# Patient Record
Sex: MALE | Race: WHITE | NOT HISPANIC OR LATINO | Employment: UNEMPLOYED | URBAN - NONMETROPOLITAN AREA
[De-identification: names, ages, dates, MRNs, and addresses within clinical notes are randomized per-mention and may not be internally consistent; named-entity substitution may affect disease eponyms.]

---

## 2018-04-26 ENCOUNTER — OFFICE VISIT (OUTPATIENT)
Dept: INTERNAL MEDICINE | Facility: CLINIC | Age: 53
End: 2018-04-26

## 2018-04-26 VITALS
SYSTOLIC BLOOD PRESSURE: 156 MMHG | WEIGHT: 207 LBS | OXYGEN SATURATION: 99 % | DIASTOLIC BLOOD PRESSURE: 102 MMHG | HEART RATE: 70 BPM | TEMPERATURE: 98.6 F | HEIGHT: 71 IN | BODY MASS INDEX: 28.98 KG/M2

## 2018-04-26 DIAGNOSIS — K42.9 UMBILICAL HERNIA WITHOUT OBSTRUCTION AND WITHOUT GANGRENE: Primary | ICD-10-CM

## 2018-04-26 DIAGNOSIS — R03.0 BLOOD PRESSURE ELEVATED WITHOUT HISTORY OF HTN: ICD-10-CM

## 2018-04-26 PROBLEM — C20 CA OF RECTUM (HCC): Status: ACTIVE | Noted: 2018-04-26

## 2018-04-26 PROCEDURE — 99214 OFFICE O/P EST MOD 30 MIN: CPT | Performed by: PHYSICIAN ASSISTANT

## 2018-04-26 NOTE — PROGRESS NOTES
Jorgito Luis is a 52 y.o. male.     Subjective   History of Present Illness   This past weekend he was raking and then the following day noticed a red and tender bulge in the umbilicus.  In 2014 had laparoscopic surgery through the umbilicus.     BP elevated today. He checked at home 2-3 weeks ago which was 146/97 which reduced to 132/80 after sitting for 10 minutes. Patient denies chest pain, dyspnea, orthopnea, palpitations, lower extremity edema, headaches, weakness, visual disturbances or confusion.         The following portions of the patient's history were reviewed and updated as appropriate: allergies, current medications, past family history, past medical history, past social history, past surgical history and problem list.    Review of Systems    Constitutional: Negative for appetite change, chills, fatigue, fever and unexpected weight change.   HENT: Negative for congestion, ear pain, hearing loss, nosebleeds, postnasal drip, rhinorrhea, sore throat, tinnitus and trouble swallowing.    Eyes: Negative for photophobia, discharge and visual disturbance.   Respiratory: Negative for cough, chest tightness, shortness of breath and wheezing.    Cardiovascular: Negative for chest pain, palpitations and leg swelling.   Gastrointestinal: bulge and redness in umbilicus. Negative for abdominal pain, blood in stool, constipation, diarrhea, nausea and vomiting.   Endocrine: Negative for cold intolerance, heat intolerance, polydipsia, polyphagia and polyuria.   Musculoskeletal: Negative for arthralgias, back pain, joint swelling, myalgias, neck pain and neck stiffness.   Skin: Negative for color change, pallor, rash and wound.   Allergic/Immunologic: Negative for environmental allergies, food allergies and immunocompromised state.   Neurological: Negative for dizziness, tremors, seizures, weakness, numbness and headaches.   Hematological: Negative for adenopathy. Does not bruise/bleed easily.   Psychiatric/Behavioral:  "Negative for sleep disturbances, agitation, behavioral problems, confusion, hallucinations, self-injury and suicidal ideas. The patient is not nervous/anxious.      Objective    Physical Exam  Constitutional: Oriented to person, place, and time. Appears well-developed and well-nourished.    Head: Normocephalic and atraumatic.   Eyes: EOM are normal. Pupils are equal, round, and reactive to light.   Neck: Normal range of motion. Neck supple.   Cardiovascular: Normal rate, regular rhythm and normal heart sounds.    Pulmonary/Chest: Effort normal and breath sounds normal. No respiratory distress.  Has no wheezes or rales. Exhibits no chest wall tenderness.   Abdominal: small non-incarcerated umbilical hernia with minimal tenderness. Soft. Bowel sounds are normal.  Musculoskeletal: Normal range of motion. Exhibits no tenderness.   Neurological: Alert and oriented to person, place, and time.   Skin: Skin is warm and dry.   Psychiatric: Has a normal mood and affect. Behavior is normal. Judgment and thought content normal.       BP (!) 156/102   Pulse 70   Temp 98.6 °F (37 °C)   Ht 180.3 cm (71\")   Wt 93.9 kg (207 lb)   SpO2 99%   BMI 28.87 kg/m²     Nursing note and vitals reviewed.        Assessment/Plan   Jorgito was seen today for abdominal issue with past surgery site.    Diagnoses and all orders for this visit:    Umbilical hernia without obstruction and without gangrene  Reassurance provided. He declined referral to general surgery at this time.     Blood pressure elevated without history of HTN  BP recheck 150/110.   Discussed implications of uncontrolled hypertension and he was resistant to treatment. He declined labs today.   Strongly encouraged him to follow up in 2 weeks for BP recheck as well as initiation of a low-sodium diet and exercise regimen.       He declined referral for colonoscopy today despite my recommendation. He has a history of rectal cancer.      "

## 2021-12-22 ENCOUNTER — TRANSCRIBE ORDERS (OUTPATIENT)
Dept: ADMINISTRATIVE | Facility: HOSPITAL | Age: 56
End: 2021-12-22

## 2021-12-22 DIAGNOSIS — M25.562 LEFT KNEE PAIN, UNSPECIFIED CHRONICITY: Primary | ICD-10-CM

## 2022-01-19 ENCOUNTER — HOSPITAL ENCOUNTER (OUTPATIENT)
Dept: MRI IMAGING | Facility: HOSPITAL | Age: 57
End: 2022-01-19

## 2022-01-21 ENCOUNTER — HOSPITAL ENCOUNTER (OUTPATIENT)
Dept: MRI IMAGING | Facility: HOSPITAL | Age: 57
Discharge: HOME | End: 2022-01-21
Admitting: ORTHOPAEDIC SURGERY

## 2022-01-21 DIAGNOSIS — M25.562 LEFT KNEE PAIN, UNSPECIFIED CHRONICITY: ICD-10-CM

## 2022-01-21 PROCEDURE — 73721 MRI JNT OF LWR EXTRE W/O DYE: CPT

## 2022-02-09 ENCOUNTER — OFFICE VISIT (OUTPATIENT)
Dept: NEUROLOGY | Facility: CLINIC | Age: 57
End: 2022-02-09

## 2022-02-09 VITALS
SYSTOLIC BLOOD PRESSURE: 150 MMHG | WEIGHT: 206 LBS | HEART RATE: 83 BPM | BODY MASS INDEX: 28.84 KG/M2 | DIASTOLIC BLOOD PRESSURE: 90 MMHG | HEIGHT: 71 IN | OXYGEN SATURATION: 97 % | TEMPERATURE: 97.1 F

## 2022-02-09 DIAGNOSIS — R06.83 SNORING: ICD-10-CM

## 2022-02-09 DIAGNOSIS — G47.9 RESTLESS SLEEPER: ICD-10-CM

## 2022-02-09 DIAGNOSIS — R41.3 MEMORY LOSS: Primary | ICD-10-CM

## 2022-02-09 PROCEDURE — 99213 OFFICE O/P EST LOW 20 MIN: CPT | Performed by: NURSE PRACTITIONER

## 2022-02-09 RX ORDER — DIAPER,BRIEF,INFANT-TODD,DISP
EACH MISCELLANEOUS
COMMUNITY
Start: 2021-11-19

## 2022-02-09 RX ORDER — LIDOCAINE 5 %
ADHESIVE PATCH, MEDICATED TOPICAL
COMMUNITY
Start: 2022-02-07

## 2022-02-09 RX ORDER — OMEGA-3-ACID ETHYL ESTERS 1 G/1
CAPSULE, LIQUID FILLED ORAL
COMMUNITY
Start: 2021-11-19

## 2022-02-09 RX ORDER — ALUMINUM HYDROXIDE, MAGNESIUM HYDROXIDE, SIMETHICONE 400; 400; 40 MG/10ML; MG/10ML; MG/10ML
SUSPENSION ORAL
COMMUNITY
Start: 2021-11-19

## 2022-02-09 RX ORDER — LISINOPRIL 10 MG/1
10 TABLET ORAL DAILY
COMMUNITY
Start: 2022-01-19

## 2022-02-09 RX ORDER — ALBUTEROL SULFATE 2.5 MG/3ML
SOLUTION RESPIRATORY (INHALATION)
COMMUNITY
Start: 2022-01-19
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
## 2022-02-09 NOTE — PATIENT INSTRUCTIONS
Sleep Apnea  Sleep apnea affects breathing during sleep. It causes breathing to stop for a short time or to become shallow. It can also increase the risk of:  · Heart attack.  · Stroke.  · Being very overweight (obese).  · Diabetes.  · Heart failure.  · Irregular heartbeat.  The goal of treatment is to help you breathe normally again.  What are the causes?  There are three kinds of sleep apnea:  · Obstructive sleep apnea. This is caused by a blocked or collapsed airway.  · Central sleep apnea. This happens when the brain does not send the right signals to the muscles that control breathing.  · Mixed sleep apnea. This is a combination of obstructive and central sleep apnea.  The most common cause of this condition is a collapsed or blocked airway. This can happen if:  · Your throat muscles are too relaxed.  · Your tongue and tonsils are too large.  · You are overweight.  · Your airway is too small.  What increases the risk?  · Being overweight.  · Smoking.  · Having a small airway.  · Being older.  · Being male.  · Drinking alcohol.  · Taking medicines to calm yourself (sedatives or tranquilizers).  · Having family members with the condition.  What are the signs or symptoms?  · Trouble staying asleep.  · Being sleepy or tired during the day.  · Getting angry a lot.  · Loud snoring.  · Headaches in the morning.  · Not being able to focus your mind (concentrate).  · Forgetting things.  · Less interest in sex.  · Mood swings.  · Personality changes.  · Feelings of sadness (depression).  · Waking up a lot during the night to pee (urinate).  · Dry mouth.  · Sore throat.  How is this diagnosed?  · Your medical history.  · A physical exam.  · A test that is done when you are sleeping (sleep study). The test is most often done in a sleep lab but may also be done at home.  How is this treated?    · Sleeping on your side.  · Using a medicine to get rid of mucus in your nose (decongestant).  · Avoiding the use of alcohol,  medicines to help you relax, or certain pain medicines (narcotics).  · Losing weight, if needed.  · Changing your diet.  · Not smoking.  · Using a machine to open your airway while you sleep, such as:  ? An oral appliance. This is a mouthpiece that shifts your lower jaw forward.  ? A CPAP device. This device blows air through a mask when you breathe out (exhale).  ? An EPAP device. This has valves that you put in each nostril.  ? A BPAP device. This device blows air through a mask when you breathe in (inhale) and breathe out.  · Having surgery if other treatments do not work.  It is important to get treatment for sleep apnea. Without treatment, it can lead to:  · High blood pressure.  · Coronary artery disease.  · In men, not being able to have an erection (impotence).  · Reduced thinking ability.  Follow these instructions at home:  Lifestyle  · Make changes that your doctor recommends.  · Eat a healthy diet.  · Lose weight if needed.  · Avoid alcohol, medicines to help you relax, and some pain medicines.  · Do not use any products that contain nicotine or tobacco, such as cigarettes, e-cigarettes, and chewing tobacco. If you need help quitting, ask your doctor.  General instructions  · Take over-the-counter and prescription medicines only as told by your doctor.  · If you were given a machine to use while you sleep, use it only as told by your doctor.  · If you are having surgery, make sure to tell your doctor you have sleep apnea. You may need to bring your device with you.  · Keep all follow-up visits as told by your doctor. This is important.  Contact a doctor if:  · The machine that you were given to use during sleep bothers you or does not seem to be working.  · You do not get better.  · You get worse.  Get help right away if:  · Your chest hurts.  · You have trouble breathing in enough air.  · You have an uncomfortable feeling in your back, arms, or stomach.  · You have trouble talking.  · One side of your  body feels weak.  · A part of your face is hanging down.  These symptoms may be an emergency. Do not wait to see if the symptoms will go away. Get medical help right away. Call your local emergency services (911 in the [place] Do not drive yourself to the hospital.  Summary  · This condition affects breathing during sleep.  · The most common cause is a collapsed or blocked airway.  · The goal of treatment is to help you breathe normally while you sleep.  This information is not intended to replace advice given to you by your health care provider. Make sure you discuss any questions you have with your health care provider.  Document Revised: 10/04/2019 Document Reviewed: 08/13/2019  [employer] Patient Education © 2021 [employer]  Dementia  Dementia is a condition that affects the way the brain works. It often affects memory and thinking. There are many types of dementia. Some types get worse with time and cannot be reversed. Some types of dementia include:  · Alzheimer's disease. This is the most common type.  · Vascular dementia. This type may happen due to a stroke.  · Lewy body dementia. This type may happen to people who have Parkinson's disease.  · Frontotemporal dementia. This type is caused by damage to nerve cells in certain parts of the brain.  Some people may have more than one type.  What are the causes?  This condition is caused by damage to cells in the brain. Some causes that cannot be reversed include:  · Having a condition that affects the blood vessels of the brain, such as diabetes, heart disease, or blood vessel disease.  · Changes to genes.  Some causes that can be reversed or slowed include:  · Injury to the brain.  · Certain medicines.  · Infection.  · Not having enough vitamin B12 in the body, or thyroid problems.  · A tumor, blood clot, or too much fluid in the brain.  · Certain diseases that cause your body's defense system (immune system) to attack healthy parts of the body.  What are the  signs or symptoms?  · Problems remembering events or people.  · Having trouble taking a bath or putting clothes on.  · Forgetting appointments.  · Forgetting to pay bills.  · Trouble planning and making meals.  · Having trouble speaking.  · Getting lost easily.  · Changes in behavior or mood.  How is this treated?  Treatment depends on the cause of the dementia. It might include:  · Taking medicines for symptoms or to help control or slow down the dementia.  · Treating the cause of your dementia.  Your doctor can help you find support groups and other doctors who can help with your care.  Follow these instructions at home:  Medicines  · Take over-the-counter and prescription medicines only as told by your doctor.  · Use a pill organizer to help you manage your medicines.  · Avoidtaking medicines for pain or for sleep.  Lifestyle  · Make healthy choices:  ? Be active as told by your doctor.  ? Do not smoke or use any products that contain nicotine or tobacco. If you need help quitting, ask your doctor.  ? Do not drink alcohol.  ? When you get stressed, do something that will help you relax. Your doctor can give you tips.  ? Spend time with other people.  · Make sure you get good sleep. To get good sleep:  ? Try not to take naps during the day.  ? Keep your bedroom dark and cool.  ? In the few hours before you go to bed, try not to do any exercise.  ? Do not have foods and drinks with caffeine at night.  Eating and drinking  · Drink enough fluid to keep your pee (urine) pale yellow.  · Eat a healthy diet.  General instructions    · Talk with your doctor to figure out:  ? What you need help with.  ? What your safety needs are.  · Ask your doctor if it is safe for you to drive.  · If told, wear a bracelet that tracks where you are or shows that you are a person with memory loss.  · Work with your family to make big decisions.  · Keep all follow-up visits.    Where to find more information  · Alzheimer's Association:  www.alz.org  · National [place] on Aging: www.radha.nih.gov/alzheimers  · World Health Organization: www.who.int  Contact a doctor if:  · You have any new symptoms.  · Your symptoms get worse.  · You have problems with swallowing or choking.  Get help right away if:  · You feel very sad, or feel that you want to harm yourself.  · Your family members are worried for your safety.  Get help right away if you feel like you may hurt yourself or others, or have thoughts about taking your own life. Go to your nearest emergency room or:  · Call your local emergency services (911 in the [place]  · Call the National Suicide Prevention Lifeline at 1-253.656.3679. This is open 24 hours a day.  · Text the Crisis Text Line at 582486.  Summary  · Dementia often affects memory and thinking.  · Some types of dementia get worse with time and cannot be reversed.  · Treatment for this condition depends on the cause.  · Talk with your doctor to figure out what you need help with.  · Your doctor can help you find support groups and other doctors who can help with your care.  This information is not intended to replace advice given to you by your health care provider. Make sure you discuss any questions you have with your health care provider.  Document Revised: 05/03/2021 Document Reviewed: 05/03/2021  [employer]vier Patient Education © 2021 [employer]  Mild Neurocognitive Disorder  Mild neurocognitive disorder, formerly known as mild cognitive impairment, is a disorder in which memory does not work as well as it should. This disorder may also cause problems with other mental functions, including thought, communication, behavior, and completion of tasks. These problems can be noticed and measured, but they usually do not interfere with daily activities or the ability to live independently.  Mild neurocognitive disorder typically develops after 60 years of age, but it can also develop at younger ages. It is not as serious as major  neurocognitive disorder, also known as dementia, but it may be the first sign of it. Generally, symptoms of this condition get worse over time. In rare cases, symptoms can get better.  What are the causes?  This condition may be caused by:  · Brain disorders like Alzheimer's disease, Parkinson's disease, and other conditions that gradually damage nerve cells (neurodegenerative conditions).  · Diseases that affect blood vessels in the brain and result in small strokes.  · Certain infections, such as HIV.  · Traumatic brain injury.  · Other medical conditions, such as brain tumors, underactive thyroid (hypothyroidism), and vitamin B12 deficiency.  · Use of certain drugs or prescription medicines.  What increases the risk?  The following factors may make you more likely to develop this condition:  · Being older than 65 years.  · Being male.  · Low education level.  · Diabetes, high blood pressure, high cholesterol, and other conditions that increase the risk for blood vessel diseases.  · Untreated or undertreated sleep apnea.  · Having a certain type of gene that can be passed from parent to child (inherited).  · Chronic health problems such as heart disease, lung disease, liver disease, kidney disease, or depression.  What are the signs or symptoms?  Symptoms of this condition include:  · Difficulty remembering. You may:  ? Forget names, phone numbers, or details of recent events.  ? Forget social events and appointments.  ? Repeatedly forget where you put your car keys or other items.  · Difficulty thinking and solving problems. You may have trouble with complex tasks, such as:  ? Paying bills.  ? Driving in unfamiliar places.  · Difficulty communicating. You may have trouble:  ? Finding the right word or naming an object.  ? Forming a sentence that makes sense, or understanding what you read or hear.  · Changes in your behavior or personality. When this happens, you may:  ? Lose interest in the things that you used  to enjoy.  ? Withdraw from social situations.  ? Get angry more easily than usual.  ? Act before thinking.  How is this diagnosed?  This condition is diagnosed based on:  · Your symptoms. Your health care provider may ask you and the people you spend time with, such as family and friends, about your symptoms.  · Evaluation of mental functions (neuropsychological testing). Your health care provider may refer you to a neurologist or mental health specialist to evaluate your mental functions in detail.  To identify the cause of your condition, your health care provider may:  · Get a detailed medical history.  · Ask about use of alcohol, drugs, and prescription medicines.  · Do a physical exam.  · Order blood tests and brain imaging exams.  How is this treated?  Mild neurocognitive disorder that is caused by medicine use, drug use, infection, or another medical condition may improve when the cause is treated, or when medicines or drugs are stopped. If this disorder has another cause, it generally does not improve and may get worse. In these cases, the goal of treatment is to help you manage the loss of mental function. Treatments in these cases include:  · Medicine. Medicine mainly helps memory and behavior symptoms.  · Talk therapy. Talk therapy provides education, emotional support, memory aids, and other ways of making up for problems with mental function.  · Lifestyle changes, including:  ? Getting regular exercise.  ? Eating a healthy diet that includes omega-3 fatty acids.  ? Challenging your thinking and memory skills.  ? Having more social interaction.  Follow these instructions at home:  Eating and drinking    · Drink enough fluid to keep your urine pale yellow.  · Eat a healthy diet that includes omega-3 fatty acids. These can be found in:  ? Fish.  ? Nuts.  ? Leafy vegetables.  ? Vegetable oils.  · If you drink alcohol:  ? Limit how much you use to:  § 0-1 drink a day for women.  § 0-2 drinks a day for  men.  ? Be aware of how much alcohol is in your drink. In the [place] one drink equals one 12 oz bottle of beer (355 mL), one 5 oz glass of wine (148 mL), or one 1½ oz glass of hard liquor (44 mL).    Lifestyle    · Get regular exercise as told by your health care provider.  · Do not use any products that contain nicotine or tobacco, such as cigarettes, e-cigarettes, and chewing tobacco. If you need help quitting, ask your health care provider.  · Practice ways to manage stress. If you need help managing stress, ask your health care provider.  · Continue to have social interaction.  · Keep your mind active with stimulating activities you enjoy, such as reading or playing games.  · Make sure to get quality sleep. Follow these tips:  ? Avoid napping during the day.  ? Keep your sleeping area dark and cool.  ? Avoid exercising during the few hours before you go to bed.  ? Avoid caffeine products in the evening.    General instructions  · Take over-the-counter and prescription medicines only as told by your health care provider. Your health care provider may recommend that you avoid taking medicines that can affect thinking, such as pain medicines or sleep medicines.  · Work with your health care provider to find out what you need help with and what your safety needs are.  · Keep all follow-up visits. This is important.  Where to find more information  · National [place] on Aging: www.radha.nih.gov  Contact a health care provider if:  · You have any new symptoms.  Get help right away if:  · You develop new confusion or your confusion gets worse.  · You act in ways that place you or your family in danger.  Summary  · Mild neurocognitive disorder is a disorder in which memory does not work as well as it should.  · Mild neurocognitive disorder can have many causes. It may be the first stage of dementia.  · To manage your condition, get regular exercise, keep your mind active, get quality sleep, and eat a healthy diet.  This  information is not intended to replace advice given to you by your health care provider. Make sure you discuss any questions you have with your health care provider.  Document Revised: 05/03/2021 Document Reviewed: 05/03/2021  Elsevier Patient Education © 2021 [employer].

## 2022-02-18 ENCOUNTER — TRANSCRIBE ORDERS (OUTPATIENT)
Dept: ADMINISTRATIVE | Facility: HOSPITAL | Age: 57
End: 2022-02-18

## 2022-02-18 DIAGNOSIS — R13.10 DYSPHAGIA, UNSPECIFIED TYPE: Primary | ICD-10-CM

## 2022-02-22 NOTE — PROGRESS NOTES
Patient: Jorgito Luis    YOB: 1965    Date: 02/23/2022    Primary Care Provider: La Ramey APRN    Chief Complaint   Patient presents with   • Hernia     umbilical       SUBJECTIVE:    History of present illness:  I saw the patient in the office today as a consultation for evaluation and treatment of an umbilical mass.He states that he had colorectal cancer and he has surgery in 10/2014, knot has formed at umbilicus.  Hernia slightly increased in size, increased pain discomfort with prolonged standing and lifting.  No change in bowel habits.    The following portions of the patient's history were reviewed and updated as appropriate: allergies, current medications, past family history, past medical history, past social history, past surgical history and problem list.    Review of Systems   Constitutional: Negative for chills, fever and unexpected weight change.   HENT: Negative for trouble swallowing and voice change.    Eyes: Negative for visual disturbance.   Respiratory: Negative for apnea, cough, chest tightness, shortness of breath and wheezing.    Cardiovascular: Negative for chest pain, palpitations and leg swelling.   Gastrointestinal: Positive for abdominal pain. Negative for abdominal distention, anal bleeding, blood in stool, constipation, diarrhea, nausea, rectal pain and vomiting.   Endocrine: Negative for cold intolerance and heat intolerance.   Genitourinary: Negative for difficulty urinating, dysuria, flank pain, scrotal swelling and testicular pain.   Musculoskeletal: Negative for back pain, gait problem and joint swelling.   Skin: Negative for color change, rash and wound.   Neurological: Negative for dizziness, syncope, speech difficulty, weakness, numbness and headaches.   Hematological: Negative for adenopathy. Does not bruise/bleed easily.   Psychiatric/Behavioral: Negative for confusion. The patient is not nervous/anxious.        History:  Past Medical History:   Diagnosis  "Date   • Arthritis    • Cancer (HCC) 2014    colon-rectum   • Cancer (HCC)    • Difficulty in walking    • Hearing loss    • Hypertension    • Memory loss        Past Surgical History:   Procedure Laterality Date   • ABDOMINAL SURGERY  2014    colon cancer removed rectum       Family History   Problem Relation Age of Onset   • Hypertension Mother    • Arthritis Mother    • Cancer Mother    • Diabetes Mother    • Heart disease Mother    • Hyperlipidemia Mother    • Malig Hypertension Mother    • Hypertension Father    • Arthritis Father    • Alcohol abuse Father    • Diabetes Father    • Heart disease Father    • Malig Hypertension Father    • Obesity Father        Social History     Tobacco Use   • Smoking status: Never Smoker   • Smokeless tobacco: Never Used   Substance Use Topics   • Alcohol use: Never   • Drug use: Not on file       Allergies:  Allergies   Allergen Reactions   • Statins Myalgia     Hurt his legs when he took them       Medications:    Current Outpatient Medications:   •  albuterol (PROVENTIL) (2.5 MG/3ML) 0.083% nebulizer solution, USE 3 ML VIA NEBULIZER THREE TIMES DAILY AS NEEDED, Disp: , Rfl:   •  Antacid 200-200-20 MG/5ML suspension, , Disp: , Rfl:   •  CBD (cannabidiol) oral oil, Take  by mouth Daily., Disp: , Rfl:   •  Diclofenac Sodium (VOLTAREN) 1 % gel gel, APPLY 2 GRAMS TOPICALLY TO THE AFFECTED AREA FOUR TIMES DAILY, Disp: , Rfl:   •  Hydrocortisone Max St 1 % cream, , Disp: , Rfl:   •  Lidoderm 5 %, , Disp: , Rfl:   •  lisinopril (PRINIVIL,ZESTRIL) 10 MG tablet, Take 10 mg by mouth Daily., Disp: , Rfl:   •  omega-3 acid ethyl esters (LOVAZA) 1 g capsule, TAKE 2 CAPSULES BY MOUTH TWICE DAILY AS DIRECTED, Disp: , Rfl:     OBJECTIVE:    Vital Signs:   Vitals:    02/23/22 1055   BP: 142/80   Pulse: 91   Resp: 18   Temp: 97.7 °F (36.5 °C)   TempSrc: Temporal   SpO2: 97%   Weight: 90.9 kg (200 lb 6.4 oz)   Height: 180.3 cm (71\")       Physical Exam:   General Appearance:    Alert, " cooperative, in no acute distress   Head:    Normocephalic, without obvious abnormality, atraumatic   Eyes:            Lids and lashes normal, conjunctivae and sclerae normal, no   icterus, no pallor, corneas clear, PERRLA   Ears:    Ears appear intact with no abnormalities noted   Throat:   No oral lesions, no thrush, oral mucosa moist   Neck:   No adenopathy, supple, trachea midline, no thyromegaly, no   carotid bruit, no JVD   Lungs:     Clear to auscultation,respirations regular, even and                  unlabored    Heart:    Regular rhythm and normal rate, normal S1 and S2, no            murmur   Abdomen:     no masses, no organomegaly, soft non-tender, non-distended, no guarding, there is evidence of a refill incisional hernia supraumbilical region.  Reducible and tender to palpation.   Extremities:   Moves all extremities well, no edema, no cyanosis, no             redness   Pulses:   Pulses palpable and equal bilaterally   Skin:   No bleeding, bruising or rash   Lymph nodes:   No palpable adenopathy   Neurologic:   Cranial nerves 2 - 12 grossly intact, sensation intact        Results Review:   I reviewed the patient's new clinical results.    Review of Systems was reviewed and confirmed as accurate as documented by the MA.    ASSESSMENT/PLAN:    1. Incisional hernia, without obstruction or gangrene        I had a detailed and extensive discussion with the patient in the office and they understand that they need to undergo incisional hernia repair with mesh.  Full risks and benefits of operative versus nonoperative intervention were discussed with the patient and these included things such as nonresolution of symptoms and possible worsening of symptoms without surgical intervention versus infection, bleeding, possible recurrent hernia, possible postoperative neuralgia from nerve damage or involvement with scar tissue, etc.  The patient understands, agrees, and had no questions for me at the end of the  office visit.  Patient will call to schedule procedure within 5 validate with his wife.     I discussed the patients findings and my recommendations with patient.    Electronically signed by George Shell MD  02/23/22

## 2022-02-23 ENCOUNTER — TELEPHONE (OUTPATIENT)
Dept: SURGERY | Facility: CLINIC | Age: 57
End: 2022-02-23

## 2022-02-23 ENCOUNTER — OFFICE VISIT (OUTPATIENT)
Dept: SURGERY | Facility: CLINIC | Age: 57
End: 2022-02-23

## 2022-02-23 ENCOUNTER — PATIENT ROUNDING (BHMG ONLY) (OUTPATIENT)
Dept: SURGERY | Facility: CLINIC | Age: 57
End: 2022-02-23

## 2022-02-23 VITALS
HEIGHT: 71 IN | OXYGEN SATURATION: 97 % | HEART RATE: 91 BPM | WEIGHT: 200.4 LBS | DIASTOLIC BLOOD PRESSURE: 80 MMHG | TEMPERATURE: 97.7 F | SYSTOLIC BLOOD PRESSURE: 142 MMHG | BODY MASS INDEX: 28.06 KG/M2 | RESPIRATION RATE: 18 BRPM

## 2022-02-23 DIAGNOSIS — K43.2 INCISIONAL HERNIA, WITHOUT OBSTRUCTION OR GANGRENE: Primary | ICD-10-CM

## 2022-02-23 PROCEDURE — 99203 OFFICE O/P NEW LOW 30 MIN: CPT | Performed by: SURGERY

## 2022-02-23 NOTE — PROGRESS NOTES
February 23, 2022    Hello, may I speak with Jorgito Luis?    My name is ALMA LAMBERT    I am  with [facility]  [address]  [place] [address] [phone].    Before we get started may I verify your date of birth? 1965    I am calling to officially welcome you to our practice and ask about your recent visit. Is this a good time to talk? yes    Tell me about your visit with us. What things went well?  VISIT WAS GOOD       We're always looking for ways to make our patients' experiences even better. Do you have recommendations on ways we may improve?  no    Overall were you satisfied with your first visit to our practice? yes       I appreciate you taking the time to speak with me today. Is there anything else I can do for you? no      Thank you, and have a great day.

## 2022-02-23 NOTE — TELEPHONE ENCOUNTER
PT IS ASKING IF HE HAS RESTRICTIONS AND IF THIS WILL BE IN HIS DOCTOR NOTE THAT WILL GO TO HIS PRIMARY CARE? PLEASE ADVISE PT BACK [phone].

## 2022-02-24 ENCOUNTER — APPOINTMENT (OUTPATIENT)
Dept: GENERAL RADIOLOGY | Facility: HOSPITAL | Age: 57
End: 2022-02-24

## 2022-03-08 ENCOUNTER — OFFICE VISIT (OUTPATIENT)
Dept: NEUROLOGY | Facility: CLINIC | Age: 57
End: 2022-03-08

## 2022-03-08 VITALS
TEMPERATURE: 96.9 F | HEIGHT: 71 IN | OXYGEN SATURATION: 97 % | BODY MASS INDEX: 28.87 KG/M2 | WEIGHT: 206.2 LBS | HEART RATE: 92 BPM | DIASTOLIC BLOOD PRESSURE: 90 MMHG | SYSTOLIC BLOOD PRESSURE: 150 MMHG

## 2022-03-08 DIAGNOSIS — R06.83 SNORING: ICD-10-CM

## 2022-03-08 DIAGNOSIS — R41.89 SUBJECTIVE MEMORY COMPLAINTS: Primary | ICD-10-CM

## 2022-03-08 DIAGNOSIS — G47.9 RESTLESS SLEEPER: ICD-10-CM

## 2022-03-08 PROCEDURE — 99213 OFFICE O/P EST LOW 20 MIN: CPT | Performed by: NURSE PRACTITIONER

## 2022-03-08 RX ORDER — IVERMECTIN 3 MG/1
TABLET ORAL
COMMUNITY
Start: 2022-02-28
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
## 2022-03-14 ENCOUNTER — HOSPITAL ENCOUNTER (OUTPATIENT)
Dept: SLEEP MEDICINE | Facility: HOSPITAL | Age: 57
Discharge: HOME | End: 2022-03-14
Admitting: NURSE PRACTITIONER

## 2022-03-14 DIAGNOSIS — R06.83 SNORING: ICD-10-CM

## 2022-03-14 DIAGNOSIS — R41.89 SUBJECTIVE MEMORY COMPLAINTS: ICD-10-CM

## 2022-03-14 DIAGNOSIS — G47.9 RESTLESS SLEEPER: ICD-10-CM

## 2022-03-14 PROCEDURE — 95800 SLP STDY UNATTENDED: CPT

## 2022-03-15 PROCEDURE — 95800 SLP STDY UNATTENDED: CPT | Performed by: INTERNAL MEDICINE

## 2022-12-05 ENCOUNTER — HOSPITAL ENCOUNTER (OUTPATIENT)
Dept: GENERAL RADIOLOGY | Facility: HOSPITAL | Age: 57
Discharge: HOME | End: 2022-12-05

## 2022-12-05 DIAGNOSIS — R13.10 DYSPHAGIA, UNSPECIFIED TYPE: ICD-10-CM

## 2022-12-05 PROCEDURE — 74220 X-RAY XM ESOPHAGUS 1CNTRST: CPT

## 2022-12-05 RX ADMIN — BARIUM SULFATE 183 ML: 960 POWDER, FOR SUSPENSION ORAL at 10:00

## 2023-09-11 ENCOUNTER — TRANSCRIBE ORDERS (OUTPATIENT)
Dept: LAB | Facility: HOSPITAL | Age: 58
End: 2023-09-11

## 2023-09-11 ENCOUNTER — HOSPITAL ENCOUNTER (OUTPATIENT)
Dept: GENERAL RADIOLOGY | Facility: HOSPITAL | Age: 58
Discharge: HOME | End: 2023-09-11

## 2023-09-11 DIAGNOSIS — Z02.1 PRE-EMPLOYMENT EXAMINATION: ICD-10-CM

## 2023-09-11 DIAGNOSIS — Z02.1 PRE-EMPLOYMENT EXAMINATION: Primary | ICD-10-CM

## 2023-09-11 PROCEDURE — 71046 X-RAY EXAM CHEST 2 VIEWS: CPT

## 2024-04-05 ENCOUNTER — TELEPHONE (OUTPATIENT)
Dept: GASTROENTEROLOGY | Facility: CLINIC | Age: 59
End: 2024-04-05

## 2024-04-05 ENCOUNTER — ANESTHESIA (OUTPATIENT)
Dept: GASTROENTEROLOGY | Facility: HOSPITAL | Age: 59
End: 2024-04-05
Payer: COMMERCIAL

## 2024-04-05 ENCOUNTER — HOSPITAL ENCOUNTER (EMERGENCY)
Facility: HOSPITAL | Age: 59
Discharge: HOME | End: 2024-04-05
Attending: STUDENT IN AN ORGANIZED HEALTH CARE EDUCATION/TRAINING PROGRAM
Payer: COMMERCIAL

## 2024-04-05 ENCOUNTER — ANESTHESIA EVENT (OUTPATIENT)
Dept: GASTROENTEROLOGY | Facility: HOSPITAL | Age: 59
End: 2024-04-05
Payer: COMMERCIAL

## 2024-04-05 VITALS
WEIGHT: 199.2 LBS | BODY MASS INDEX: 27.89 KG/M2 | TEMPERATURE: 97.1 F | RESPIRATION RATE: 18 BRPM | HEART RATE: 76 BPM | HEIGHT: 71 IN | SYSTOLIC BLOOD PRESSURE: 152 MMHG | OXYGEN SATURATION: 95 % | DIASTOLIC BLOOD PRESSURE: 96 MMHG

## 2024-04-05 DIAGNOSIS — T18.128A ESOPHAGEAL OBSTRUCTION DUE TO FOOD IMPACTION: Primary | ICD-10-CM
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
## 2024-04-05 LAB — GLUCOSE BLDC GLUCOMTR-MCNC: 91 MG/DL (ref 70–130)

## 2024-04-05 PROCEDURE — 25010000002 PROPOFOL 10 MG/ML EMULSION: Performed by: NURSE ANESTHETIST, CERTIFIED REGISTERED

## 2024-04-05 PROCEDURE — 25010000002 GLUCAGON (RDNA) PER 1 MG: Performed by: STUDENT IN AN ORGANIZED HEALTH CARE EDUCATION/TRAINING PROGRAM

## 2024-04-05 PROCEDURE — 43247 EGD REMOVE FOREIGN BODY: CPT | Performed by: INTERNAL MEDICINE

## 2024-04-05 PROCEDURE — 99285 EMERGENCY DEPT VISIT HI MDM: CPT

## 2024-04-05 PROCEDURE — 25010000002 ONDANSETRON PER 1 MG: Performed by: STUDENT IN AN ORGANIZED HEALTH CARE EDUCATION/TRAINING PROGRAM

## 2024-04-05 PROCEDURE — 25810000003 SODIUM CHLORIDE 0.9 % SOLUTION: Performed by: INTERNAL MEDICINE

## 2024-04-05 PROCEDURE — 99284 EMERGENCY DEPT VISIT MOD MDM: CPT | Performed by: INTERNAL MEDICINE

## 2024-04-05 PROCEDURE — 82948 REAGENT STRIP/BLOOD GLUCOSE: CPT

## 2024-04-05 PROCEDURE — 96374 THER/PROPH/DIAG INJ IV PUSH: CPT

## 2024-04-05 PROCEDURE — 96375 TX/PRO/DX INJ NEW DRUG ADDON: CPT

## 2024-04-05 RX ORDER — SODIUM CHLORIDE 9 MG/ML
70 INJECTION, SOLUTION INTRAVENOUS CONTINUOUS PRN
Status: DISCONTINUED | OUTPATIENT
Start: 2024-04-05 | End: 2024-04-05 | Stop reason: HOSPADM

## 2024-04-05 RX ORDER — LANSOPRAZOLE 30 MG/1
30 TABLET, ORALLY DISINTEGRATING, DELAYED RELEASE ORAL DAILY
Qty: 30 TABLET | Refills: 2 | Status: SHIPPED | OUTPATIENT
Start: 2024-04-05

## 2024-04-05 RX ORDER — ONDANSETRON 2 MG/ML
4 INJECTION INTRAMUSCULAR; INTRAVENOUS ONCE
Status: COMPLETED | OUTPATIENT
Start: 2024-04-05 | End: 2024-04-05

## 2024-04-05 RX ORDER — PROPOFOL 10 MG/ML
VIAL (ML) INTRAVENOUS AS NEEDED
Status: DISCONTINUED | OUTPATIENT
Start: 2024-04-05 | End: 2024-04-05 | Stop reason: SURG

## 2024-04-05 RX ORDER — SODIUM CHLORIDE 0.9 % (FLUSH) 0.9 %
10 SYRINGE (ML) INJECTION AS NEEDED
Status: DISCONTINUED | OUTPATIENT
Start: 2024-04-05 | End: 2024-04-05 | Stop reason: HOSPADM

## 2024-04-05 RX ORDER — IBUPROFEN 600 MG/1
1 TABLET ORAL ONCE
Status: COMPLETED | OUTPATIENT
Start: 2024-04-05 | End: 2024-04-05

## 2024-04-05 RX ORDER — PANTOPRAZOLE SODIUM 40 MG/1
40 TABLET, DELAYED RELEASE ORAL DAILY
Qty: 30 TABLET | Refills: 2 | Status: SHIPPED | OUTPATIENT
Start: 2024-04-05 | End: 2024-04-05 | Stop reason: ALTCHOICE

## 2024-04-05 RX ADMIN — Medication 1 MG: at 06:20

## 2024-04-05 RX ADMIN — ONDANSETRON 4 MG: 2 INJECTION INTRAMUSCULAR; INTRAVENOUS at 06:20

## 2024-04-05 RX ADMIN — LIDOCAINE HYDROCHLORIDE 60 MG: 20 INJECTION, SOLUTION INTRAVENOUS at 09:13

## 2024-04-05 RX ADMIN — SODIUM CHLORIDE 70 ML/HR: 9 INJECTION, SOLUTION INTRAVENOUS at 08:42

## 2024-04-05 RX ADMIN — PROPOFOL 200 MG: 10 INJECTION, EMULSION INTRAVENOUS at 09:13

## 2024-04-05 NOTE — CONSULTS
In Patient Consult      Date of Consultation: 2024  Patient Name: Jorgito Luis  MRN: [id number]  : 1965     Referring provider: No att. providers found    Primary care provider:  Provider, No Known    Reason for consultation: Esophageal food bolus obstruction    History of Present Illness: This is a 58-year-old male patient with a prior history of hypertension, hyperlipidemia, gastroesophageal reflux disease, history of rectal cancer who was presented to emergency room early this morning on 2024 with complaints of stuck in esophagus.    He states that he had a steak meal at about 3 PM yesterday since then has been feeling that food was stuck in the esophagus was not able to swallow saliva completely not able to keep down anything.  Had multiple episodes of nausea vomiting since then.  Finally came to emergency room further evaluation.  He does have a prior history of hiatal hernia and reflux disease.  He does have a prior history of esophagram in  that revealed hiatal hernia with esophageal ring.      Has been having issues with the dysphagia for a long time.  Normally he gets issues with the food bolus stuck in the eventually will make it out.  No Prior Endoscopy.  He had a rectal cancer 20 years ago and had a surgery.  No recent colonoscopy.       Subjective     Past Medical History:   Diagnosis Date    Arthritis     Cancer     colon-rectum    Cancer     Difficulty in walking     Hearing loss     Hypertension     Memory loss        Past Surgical History:   Procedure Laterality Date    ABDOMINAL SURGERY      colon cancer removed rectum       Family History   Problem Relation Age of Onset    Hypertension Mother     Arthritis Mother     Cancer Mother     Diabetes Mother     Heart disease Mother     Hyperlipidemia Mother     Malig Hypertension Mother     Hypertension Father     Arthritis Father     Alcohol abuse Father     Diabetes Father     Heart disease Father     Malig  "Hypertension Father     Obesity Father        Social History     Socioeconomic History    Marital status:    Tobacco Use    Smoking status: Never    Smokeless tobacco: Never   Substance and Sexual Activity    Alcohol use: Never         Current Facility-Administered Medications:     [COMPLETED] Insert Peripheral IV, , , Once **AND** [Transfer Hold] sodium chloride 0.9 % flush 10 mL, 10 mL, Intravenous, PRN, Duke Grider, DO    sodium chloride 0.9 % infusion, 70 mL/hr, Intravenous, Continuous PRN, Jacki Perera MD    Allergies   Allergen Reactions    Vancomycin Itching     Has to be given benadryl before he can take vancomycin    Statins Myalgia     Hurt his legs when he took them       Review of Systems   Constitutional:  Negative for appetite change, fatigue, fever and unexpected weight loss.   HENT:  Positive for trouble swallowing.    Gastrointestinal:  Negative for abdominal distention, abdominal pain, anal bleeding, blood in stool, constipation, diarrhea, nausea, rectal pain, vomiting, GERD and indigestion.        The following portions of the patient's history were reviewed and updated as appropriate: allergies, current medications, past family history, past medical history, past social history, past surgical history and problem list.    Objective     Vitals:    04/05/24 0517 04/05/24 0759 04/05/24 0800 04/05/24 0829   BP: (!) 173/108 (!) 157/103  (!) 157/103   BP Location:    Right arm   Patient Position:    Sitting   Pulse: 84   71   Resp: 16   18   Temp: 98.2 °F (36.8 °C)   98.2 °F (36.8 °C)   TempSrc: Oral   Temporal   SpO2: 95%  95% 97%   Weight: 90.4 kg (199 lb 3.2 oz)      Height: 180.3 cm (71\")          Physical Exam  Constitutional:       Appearance: Normal appearance.   HENT:      Head: Normocephalic and atraumatic.   Eyes:      Conjunctiva/sclera: Conjunctivae normal.   Abdominal:      General: Abdomen is flat. There is no distension.      Palpations: There is no mass.      " "Tenderness: There is no abdominal tenderness. There is no guarding or rebound.      Hernia: No hernia is present.   Musculoskeletal:      Cervical back: Normal range of motion and neck supple.   Neurological:      Mental Status: He is alert.               Invalid input(s): \"PROT\"    Imaging Results (Last 24 Hours)       ** No results found for the last 24 hours. **            Assessment / Plan      Assessment/Recommendations:     Esophageal obstruction secondary to food bolus  Esophageal dysphagia  History of hiatal hernia and esophageal ring  Gastroesophageal reflux disease  History of rectal cancer status postsurgery 20 years ago  Patient had a steak meal at about 3 PM yesterday.  History suspicious for food bolus lodged in the lower esophagus.  He needs an urgent EGD for further management.  At this time patient declined any biopsies polypectomies and further evaluation on dysphagia.  We agreed on performing EGD and remove the food bolus at this time and risk and benefits discussed.    Keep n.p.o.  Schedule for an urgent EGD  Patient had history of rectal cancer and never had a surveillance colonoscopy in the recent past.  He needs colonoscopy soon as well and is to be followed as an outpatient with a GI of his choice            Thank you very much for letting me participate in the care of this patient.  Please do not hesitate to call me if you have any questions.    Jacki Perera MD  [facility] [place]  4/5/2024  08:35 EDT    Please note that portions of this note may have been completed with a voice recognition program.   "

## 2024-04-05 NOTE — ANESTHESIA POSTPROCEDURE EVALUATION
Patient: Jorgito Luis    Procedure Summary       Date: 04/05/24 Room / Location: [facility] ENDOSCOPY 2 / [facility] ENDOSCOPY    Anesthesia Start: 0910 Anesthesia Stop: 0921    Procedure: ESOPHAGOGASTRODUODENOSCOPY WITH FORIEGN BODY REMOVAL (Esophagus) Diagnosis:       Esophageal obstruction due to food impaction      (Esophageal obstruction due to food impaction [T18.128A, W44.F3XA])    Surgeons: Jacki Perera MD Provider: Marciano Meyer CRNA    Anesthesia Type: MAC ASA Status: 2            Anesthesia Type: MAC    Vitals  No vitals data found for the desired time range.          Post Anesthesia Care and Evaluation    Patient location during evaluation: bedside  Patient participation: complete - patient participated  Level of consciousness: awake  Pain score: 0  Pain management: adequate    Airway patency: patent  Anesthetic complications: No anesthetic complications  PONV Status: controlled  Cardiovascular status: acceptable and stable  Respiratory status: acceptable and room air  Hydration status: acceptable    Comments: See nursing documentation for post op vital signs

## 2024-04-05 NOTE — ANESTHESIA PREPROCEDURE EVALUATION
Anesthesia Evaluation     Patient summary reviewed and Nursing notes reviewed   NPO Solid Status: > 8 hours  NPO Liquid Status: > 8 hours           Airway   Mallampati: II  TM distance: >3 FB  Neck ROM: full  Possible difficult intubation  Dental - normal exam     Pulmonary - normal exam   Cardiovascular - normal exam    (+) hypertension well controlled less than 2 medications      Neuro/Psych  GI/Hepatic/Renal/Endo      Musculoskeletal     Abdominal  - normal exam   Substance History      OB/GYN          Other   arthritis,   history of cancer active                Anesthesia Plan    ASA 2     MAC     intravenous induction     Anesthetic plan, risks, benefits, and alternatives have been provided, discussed and informed consent has been obtained with: patient.  Pre-procedure education provided  Plan discussed with CRNA.    CODE STATUS:

## 2024-04-05 NOTE — TELEPHONE ENCOUNTER
Caller: Jorgito Luis    Relationship to patient: Self    Best call back number: [phone]    Patient is needing: PT CALLED AND SAID HE WENT TO  THE PANTOPRAZOLE AT HIS [facility], BUT THEY DO NOT CARRY IT IN THE LIQUID FORM WHICH HE HAS TO HAVE. HE EITHER NEEDS A DIFFERENT PRESCRIPTION, OR ANOTHER PHARMACY THAT CARRIES IT. PLEASE ADVISE.

## 2024-04-05 NOTE — DISCHARGE INSTRUCTIONS
No pushing, pulling, tugging,  heavy lifting, or strenuous activity.  No major decision making, driving, or drinking alcoholic beverages for 24 hours. ( due to the medications you have  received)  Always use good hand hygiene/washing techniques.  NO driving while taking pain medications.    * if you have an incision:  Check your incision area every day for signs of infection.   Check for:  * more redness, swelling, or pain  *more fluid or blood  *warmth  *pus or bad smell    To assist you in voiding:  Drink plenty of fluids  Listen to running water while attempting to void.    If you are unable to urinate and you have an uncomfortable urge to void or it has been   6 hours since you were discharged, return to the Emergency Room    - Discharge patient to home (ambulatory).   - Chopped food daily  - PPI daily  - Repeat upper endoscopy in 4-8 weeks for retreatment after follow up in office and discussion with patient regarding the procedure  - Needs serial dilation.   - Return to GI office in 4 weeks.

## 2024-04-05 NOTE — ED PROVIDER NOTES
EMERGENCY DEPARTMENT ENCOUNTER    Pt Name: Jorgito Luis  MRN: [id number]  Pt :   1965  Room Number:  [facility] OR  Date of encounter:  2024  PCP: Provider, No Known  ED Provider: Duke Grider DO      HPI:  Chief Complaint: Esophageal foreign body    Context: Jorgito Luis is a 58 y.o. male with past medical history of hiatal hernia who presents to the ED with esophageal foreign body sensation.  Patient reports around 3:00 yesterday he ate steak and shortly after developed symptoms.  Has a sensation that there is a piece of food stuck in the middle part of his chest.  Duration constant.  Tried to drink Coke without relief. Reports multiple episodes of nonbloody vomiting with associated nausea.  Unable to tolerate liquid or solid oral intake.  No fever, chills, chest pain, abdominal pain, changes in bowel movements.    PAST MEDICAL HISTORY  Past Medical History:   Diagnosis Date    Arthritis     Cancer     colon-rectum    Cancer     Difficulty in walking     Hearing loss     Hypertension     Memory loss      Current Outpatient Medications   Medication Instructions    albuterol (PROVENTIL) (2.5 MG/3ML) 0.083% nebulizer solution USE 3 ML VIA NEBULIZER THREE TIMES DAILY AS NEEDED    Antacid 200-200-20 MG/5ML suspension No dose, route, or frequency recorded.    CBD (cannabidiol) oral oil Oral, Daily    Diclofenac Sodium (VOLTAREN) 1 % gel gel APPLY 2 GRAMS TOPICALLY TO THE AFFECTED AREA FOUR TIMES DAILY    Hydrocortisone Max St 1 % cream No dose, route, or frequency recorded.    ivermectin (STROMECTOL) 3 MG tablet tablet TAKE 4 TABLETS BY MOUTH EVERY WEEK    Lidoderm 5 % No dose, route, or frequency recorded.    lisinopril (PRINIVIL,ZESTRIL) 10 mg, Oral, Daily    omega-3 acid ethyl esters (LOVAZA) 1 g capsule TAKE 2 CAPSULES BY MOUTH TWICE DAILY AS DIRECTED        PAST SURGICAL HISTORY  Past Surgical History:   Procedure Laterality Date    ABDOMINAL SURGERY      colon cancer removed rectum        FAMILY HISTORY  Family History   Problem Relation Age of Onset    Hypertension Mother     Arthritis Mother     Cancer Mother     Diabetes Mother     Heart disease Mother     Hyperlipidemia Mother     Malig Hypertension Mother     Hypertension Father     Arthritis Father     Alcohol abuse Father     Diabetes Father     Heart disease Father     Malig Hypertension Father     Obesity Father        SOCIAL HISTORY  Social History     Socioeconomic History    Marital status:    Tobacco Use    Smoking status: Never    Smokeless tobacco: Never   Substance and Sexual Activity    Alcohol use: Never       ALLERGIES  Statins    REVIEW OF SYSTEMS  All systems reviewed and negative except for those discussed in HPI.     PHYSICAL EXAM  ED Triage Vitals [04/05/24 0517]   Temp Heart Rate Resp BP SpO2   98.2 °F (36.8 °C) 84 16 (!) 173/108 95 %      Temp src Heart Rate Source Patient Position BP Location FiO2 (%)   Oral -- -- -- --     I have reviewed the triage vital signs and nursing notes.    General: Alert.  Nontoxic appearance.  No acute distress.  Head: Normocephalic.  Atraumatic.  Eyes: No scleral icterus.  ENT: Moist mucous membranes.  Airway is patent.  Tolerating oral secretions appropriately.  Cardiovascular: Regular rate and rhythm.  No murmurs.  No rubs.  2+ distal pulses bilaterally.  Respiratory: Equal breath sounds bilaterally.  No rales.  No rhonchi.  No wheezing.  GI: Abdomen is soft.  Nondistended.  Nontender to palpation.  No rebound.  No guarding.  No CVA tenderness.  MSK: Moves all 4 extremities.  Neurologic: Oriented x 3.  No focal deficits.  Skin: No erythema.  No edema. No pallor. No cyanosis.  Psych: Normal mood and affect.    LAB RESULTS  Recent Results (from the past 24 hour(s))   POC Glucose Once    Collection Time: 04/05/24  7:58 AM    Specimen: Blood   Result Value Ref Range    Glucose 91 70 - 130 mg/dL       RADIOLOGY  No Radiology Exams Resulted Within Past 24  Hours    PROCEDURES  Procedures    MEDICATIONS GIVEN IN ER  Medications   sodium chloride 0.9 % flush 10 mL ( Intravenous MAR Hold 4/5/24 0822)   ondansetron (ZOFRAN) injection 4 mg (4 mg Intravenous Given 4/5/24 0620)   Glucagon (GLUCAGEN) injection 1 mg (1 mg Intravenous Given 4/5/24 0620)       MEDICAL DECISION MAKING  58 y.o. male with past medical history listed above who presents with esophageal foreign body sensation. Vital signs remarkable for hypertension, otherwise within normal limits. Based on clinical presentation and physical exam, differential diagnosis includes, but is not limited to, food impaction, esophageal web, esophageal stricture, hiatal hernia.     ED Course as of 04/05/24 0824   Fri Apr 05, 2024   0706 Patient reevaluated after glucagon administration.  Still continues to endorse symptoms.  Had 1 episode of vomiting after p.o. challenge. [JS]   0708 Case discussed with Dr. Perera (gastroenterology).  Agrees to see the patient as a consult. Plan for emergent EGD. [JS]      ED Course User Index  [JS] Duke Grider DO     REPEAT VITAL SIGNS  AS OF 08:24 EDT VITALS:  BP - (!) 157/103  HR - 84  TEMP - 98.2 °F (36.8 °C) (Oral)  O2 SATS - 95%    DIAGNOSIS  Final diagnoses:   Esophageal obstruction due to food impaction       DISPOSITION  ED Disposition       ED Disposition   Send to OR    Condition   --    Comment   --                     Please note that portions of this document were completed with voice recognition software.        Duke Grider DO  04/06/24 0715

## 2024-04-08 ENCOUNTER — PRIOR AUTHORIZATION (OUTPATIENT)
Dept: GASTROENTEROLOGY | Facility: CLINIC | Age: 59
End: 2024-04-08
Payer: COMMERCIAL

## 2024-04-08 ENCOUNTER — NURSE TRIAGE (OUTPATIENT)
Dept: CALL CENTER | Facility: HOSPITAL | Age: 59
End: 2024-04-08
Payer: COMMERCIAL

## 2024-04-08 NOTE — TELEPHONE ENCOUNTER
Reason for Disposition   [1] Prescription not at pharmacy AND [2] was prescribed by PCP recently (Exception: [facility] has access to EMR and prescription is recorded there. Go to [facility] and confirm for pharmacy.)    Additional Information   Negative: [1] Intentional drug overdose AND [2] suicidal thoughts or ideas   Negative: Drug overdose and triager unable to answer question   Negative: Caller requesting a renewal or refill of a medicine patient is currently taking   Negative: Caller requesting information unrelated to medicine   Negative: Caller requesting information about COVID-19 Vaccine   Negative: Caller requesting information about Emergency Contraception   Negative: Caller requesting information about Combined Birth Control Pills   Negative: Caller requesting information about Progestin Birth Control Pills   Negative: Caller requesting information about Post-Op pain or medicines   Negative: Caller requesting a prescription antibiotic (such as Penicillin) for Strep throat and has a positive culture result   Negative: Caller requesting a prescription anti-viral med (such as Tamiflu) and has influenza (flu) symptoms   Negative: Immunization reaction suspected   Negative: Rash while taking a medicine or within 3 days of stopping it   Negative: [1] Asthma and [2] having symptoms of asthma (cough, wheezing, etc.)   Negative: [1] Symptom of illness (e.g., headache, abdominal pain, earache, vomiting) AND [2] more than mild   Negative: Breastfeeding questions about mother's medicines and diet   Negative: MORE THAN A DOUBLE DOSE of a prescription or over-the-counter (OTC) drug   Negative: [1] DOUBLE DOSE (an extra dose or lesser amount) of prescription drug AND [2] any symptoms (e.g., dizziness, nausea, pain, sleepiness)   Negative: [1] DOUBLE DOSE (an extra dose or lesser amount) of over-the-counter (OTC) drug AND [2] any symptoms (e.g., dizziness, nausea, pain, sleepiness)   Negative: Took another person's  "prescription drug   Negative: [1] DOUBLE DOSE (an extra dose or lesser amount) of prescription drug AND [2] NO symptoms  (Exception: A double dose of antibiotics.)   Negative: Diabetes drug error or overdose (e.g., took wrong type of insulin or took extra dose)   Negative: [1] Pharmacy calling with prescription question AND [2] triager unable to answer question    Answer Assessment - Initial Assessment Questions  1. NAME of MEDICINE: \"What medicine(s) are you calling about?\"     Unsure of the medication.  They switched me from protonix to this other one and it needs a PA.  2. QUESTION: \"What is your question?\" (e.g., double dose of medicine, side effect)      Need the office to call pharmacy with a reason I had to switch medications.   3. PRESCRIBER: \"Who prescribed the medicine?\" Reason: if prescribed by specialist, call should be referred to that group.      Dilma  4. SYMPTOMS: \"Do you have any symptoms?\" If Yes, ask: \"What symptoms are you having?\"  \"How bad are the symptoms (e.g., mild, moderate, severe)      No.  5. PREGNANCY:  \"Is there any chance that you are pregnant?\" \"When was your last menstrual period?\"      No.    Protocols used: Medication Question Call-ADULT-    "

## 2024-04-08 NOTE — TELEPHONE ENCOUNTER
Calling back because the insurance needs a PA for this new medicine.  Warm transfer to [facility] GI office for assistance.

## 2024-05-02 ENCOUNTER — OFFICE VISIT (OUTPATIENT)
Dept: GASTROENTEROLOGY | Facility: CLINIC | Age: 59
End: 2024-05-02
Payer: COMMERCIAL

## 2024-05-02 VITALS
OXYGEN SATURATION: 98 % | WEIGHT: 207 LBS | SYSTOLIC BLOOD PRESSURE: 142 MMHG | HEART RATE: 86 BPM | DIASTOLIC BLOOD PRESSURE: 80 MMHG | BODY MASS INDEX: 28.87 KG/M2

## 2024-05-02 DIAGNOSIS — K22.10 ULCER OF ESOPHAGUS WITHOUT BLEEDING: Primary | ICD-10-CM

## 2024-05-02 DIAGNOSIS — K22.2 SCHATZKI'S RING: ICD-10-CM

## 2024-05-02 DIAGNOSIS — R13.19 ESOPHAGEAL DYSPHAGIA: ICD-10-CM

## 2024-05-02 PROCEDURE — 99214 OFFICE O/P EST MOD 30 MIN: CPT | Performed by: PHYSICIAN ASSISTANT

## 2024-05-02 PROCEDURE — 1160F RVW MEDS BY RX/DR IN RCRD: CPT | Performed by: PHYSICIAN ASSISTANT

## 2024-05-02 PROCEDURE — 1159F MED LIST DOCD IN RCRD: CPT | Performed by: PHYSICIAN ASSISTANT

## 2024-05-02 RX ORDER — CALCIUM CARBONATE 300MG(750)
400 TABLET,CHEWABLE ORAL DAILY
COMMUNITY

## 2024-05-02 RX ORDER — SODIUM CHLORIDE 9 MG/ML
30 INJECTION, SOLUTION INTRAVENOUS CONTINUOUS PRN
OUTPATIENT
Start: 2024-05-02

## 2024-05-02 RX ORDER — MELATONIN
1000 DAILY
COMMUNITY

## 2024-05-02 RX ORDER — VIT C/B6/B5/MAGNESIUM/HERB 173 50-5-6-5MG
1 CAPSULE ORAL DAILY
COMMUNITY

## 2024-05-02 RX ORDER — ACETAMINOPHEN 160 MG/5ML
SUSPENSION ORAL
COMMUNITY

## 2024-05-02 RX ORDER — LOSARTAN POTASSIUM 25 MG/1
25 TABLET ORAL
COMMUNITY

## 2024-05-02 NOTE — PROGRESS NOTES
Follow Up Note     Date: 2024   Patient Name: Jorgito Luis  MRN: [id number]  : 1965     Primary Care Provider: Provider, No Known     Chief Complaint   Patient presents with    Results     Upper GI Endoscopy      History of present illness:   2024  Jorgito Luis is a 58 y.o. male who is here today for follow up regarding Results (Upper GI Endoscopy).     He had ED visit 2024 due to food bolus. Had EGD at that time with Dr. Perera. Since the procedure, he has only had sensation of food getting stuck 1 time with chips. He has not had this occur as frequently as previous. At the time of his last EGD, he had eaten stuck that evening and it was there for several hours, got to the point he was not able to swallow saliva. He took prevacid dissolvable pills for a couple of days but stopped on his own when he developed cold sores. He is unable to swallow pills, has not been able to for many years.     He has anxiety regarding taking medications and medical procedures. He states he worries about medication side effects and risks of procedures. He worries about biopsies causing any present cancer to spread.     Interval History:  2024 inpatient GI visit  This is a 58-year-old male patient with a prior history of hypertension, hyperlipidemia, gastroesophageal reflux disease, history of rectal cancer who was presented to emergency room early this morning on 2024 with complaints of stuck in esophagus.     He states that he had a steak meal at about 3 PM yesterday since then has been feeling that food was stuck in the esophagus was not able to swallow saliva completely not able to keep down anything.  Had multiple episodes of nausea vomiting since then.  Finally came to emergency room further evaluation.  He does have a prior history of hiatal hernia and reflux disease.  He does have a prior history of esophagram in  that revealed hiatal hernia with esophageal ring.       Has been having  issues with the dysphagia for a long time.  Normally he gets issues with the food bolus stuck in the eventually will make it out.  No Prior Endoscopy.  He had a rectal cancer 20 years ago and had a surgery.  No recent colonoscopy.     Subjective     Past Medical History:   Diagnosis Date    Arthritis     Cancer 2014    colon-rectum    Cancer     Difficulty in walking     Hearing loss     Hypertension     Memory loss      Past Surgical History:   Procedure Laterality Date    ABDOMINAL SURGERY  2014    colon cancer removed rectum    ENDOSCOPY N/A 4/5/2024    Procedure: ESOPHAGOGASTRODUODENOSCOPY WITH FORIEGN BODY REMOVAL;  Surgeon: Jacki Perera MD;  [facility]: [facility] ENDOSCOPY;  Service: Gastroenterology;  Laterality: N/A;     Family History   Problem Relation Age of Onset    Hypertension Mother     Arthritis Mother     Cancer Mother     Diabetes Mother     Heart disease Mother     Hyperlipidemia Mother     Malig Hypertension Mother     Hypertension Father     Arthritis Father     Alcohol abuse Father     Diabetes Father     Heart disease Father     Malig Hypertension Father     Obesity Father      Social History     Socioeconomic History    Marital status:    Tobacco Use    Smoking status: Never    Smokeless tobacco: Never   Vaping Use    Vaping status: Never Used   Substance and Sexual Activity    Alcohol use: Never    Drug use: Never    Sexual activity: Defer     Current Outpatient Medications:     acetaminophen (TYLENOL) 160 MG/5ML liquid, Take  by mouth., Disp: , Rfl:     albuterol (PROVENTIL) (2.5 MG/3ML) 0.083% nebulizer solution, USE 3 ML VIA NEBULIZER THREE TIMES DAILY AS NEEDED, Disp: , Rfl:     Antacid 200-200-20 MG/5ML suspension, , Disp: , Rfl:     ascorbic acid (VITAMIN C) 250 MG tablet, Take 1 tablet by mouth Daily., Disp: , Rfl:     CBD (cannabidiol) oral oil, Take  by mouth Daily., Disp: , Rfl:     Cholecalciferol 25 MCG (1000 UT) tablet, Take 1 tablet by mouth Daily., Disp: , Rfl:      Hydrocortisone Max St 1 % cream, , Disp: , Rfl:     ibuprofen (ADVIL,MOTRIN) 100 MG/5ML suspension, Every 6 (Six) Hours As Needed., Disp: , Rfl:     ivermectin (STROMECTOL) 3 MG tablet tablet, TAKE 4 TABLETS BY MOUTH EVERY WEEK, Disp: , Rfl:     L-THEANINE PO, Take  by mouth Daily., Disp: , Rfl:     Lidoderm 5 %, , Disp: , Rfl:     lisinopril (PRINIVIL,ZESTRIL) 10 MG tablet, Take 1 tablet by mouth Daily., Disp: , Rfl:     losartan (COZAAR) 25 MG tablet, Take 1 tablet by mouth., Disp: , Rfl:     Magnesium 400 MG tablet, Take 400 mg by mouth Daily., Disp: , Rfl:     Melatonin-Pyridoxine (MELATONIN CR PO), Take  by mouth., Disp: , Rfl:     Turmeric 500 MG capsule, Take 1 capsule by mouth Daily., Disp: , Rfl:     Allergies   Allergen Reactions    Vancomycin Itching     Has to be given benadryl before he can take vancomycin    Lansoprazole Other (See Comments)     Mouth blisters    Statins Myalgia     Hurt his legs when he took them     The following portions of the patient's history were reviewed and updated as appropriate: allergies, current medications, past family history, past medical history, past social history, past surgical history and problem list.    Objective     Physical Exam  Constitutional:       General: He is not in acute distress.     Appearance: Normal appearance. He is well-developed. He is not diaphoretic.   HENT:      Head: Normocephalic and atraumatic.      Right Ear: External ear normal.      Left Ear: External ear normal.      Nose: Nose normal.   Eyes:      General: No scleral icterus.        Right eye: No discharge.         Left eye: No discharge.      Conjunctiva/sclera: Conjunctivae normal.   Neck:      Trachea: No tracheal deviation.   Pulmonary:      Effort: Pulmonary effort is normal. No respiratory distress.   Musculoskeletal:      Cervical back: Normal range of motion.      Comments: Ambulates with a cane   Skin:     Coloration: Skin is not pale.      Findings: No erythema or rash.    Neurological:      Mental Status: He is alert and oriented to person, place, and time.      Coordination: Coordination normal.   Psychiatric:         Behavior: Behavior normal.         Thought Content: Thought content normal.         Judgment: Judgment normal.      Comments: Mild anxious affect     Vitals:    05/02/24 0907   BP: 142/80   Pulse: 86   SpO2: 98%   Weight: 93.9 kg (207 lb)     Results Review:   I reviewed the patient's new clinical results.    Labs 11/2023 platelets 376,000, Hgb 14.0, WBC 7.06  Labs 10/2023 platelets 318,000    EGD 4/5/2024  - Food in the lower third of the esophagus. Removal was successful. - Esophageal ulcer with no stigmata of recent bleeding due to prolonged food impaction. - High-grade of narrowing Schatzki ring. Scope passed after gentle manipulation - Erythematous mucosa in the posterior wall of the stomach, antrum and prepyloric region of the stomach. - Normal duodenal bulb, first portion of the duodenum, second portion of the duodenum and third portion of the duodenum.   Patient declined any biopsies at this time.    Assessment / Plan      1. Ulcer of esophagus without bleeding  2. Schatzki's ring  3. Esophageal dysphagia  Had food bolus 4/5/2024, had EGD at that time. Had Schatzki's ring noted and slight manipulation allowed scope passed. He declined biopsies previously. Had esophageal ulcer. Unable to swallow pills. Took prevacid only for a couple of days and discontinued on his own. He is worried about medication side effects. Has had dysphagia for many years.     Repeat EGD in 4 weeks  Pepcid suspension twice daily for next 4 weeks  He is not sure about biopsies but agreeable to EGD with possible dilatation    - Famotidine 20mg/5mL suspension; Take 10 mL by mouth 2 (Two) Times a Day Before Meals.  Dispense: 600 mL; Refill: 0    He will need an esophagogastroduodenoscopy performed with monitored anesthesia care. The indications, technique, alternatives and potential risk  and complications were discussed with the patient including but not limited to bleeding, intestinal perforations, missing lesions and anesthetic complications. The patient understands and wishes to proceed with the procedure and has given their verbal consent. Written patient education information was given to the patient. He should follow up in the office after this procedure to discuss the results and further recommendations can be made at that time. The patient will call if they have further questions before procedure.  - Case Request      Prior history  History of rectal cancer status post surgery 20 years ago  Complete details unknown. Needs colonoscopy soon, to discuss next visit    Follow Up:   Return for follow up after procedure to discuss results.    Trisha Capps PA-C  [facility]  5/2/2024  14:31 EDT    Dictated Utilizing Dragon Dictation: Part of this note may be an electronic transcription/translation of spoken language to printed text using the Dragon Dictation System.

## 2024-05-03 ENCOUNTER — TELEPHONE (OUTPATIENT)
Dept: GASTROENTEROLOGY | Facility: CLINIC | Age: 59
End: 2024-05-03
Payer: COMMERCIAL

## 2024-05-03 PROBLEM — K22.10 ULCER OF ESOPHAGUS WITHOUT BLEEDING: Status: ACTIVE | Noted: 2024-05-02

## 2024-05-03 PROBLEM — R13.19 ESOPHAGEAL DYSPHAGIA: Status: ACTIVE | Noted: 2024-05-02

## 2024-05-03 NOTE — TELEPHONE ENCOUNTER
We were contacted by Billie Aguilera, Famotidine liquid not available in the 20 mg/5ML, but is available 40 mg/5mL.  Changed to the 40 mg/5mL, he is to take 5 ml twice a day before meals, 30 days supply (300 mL), given to Billie pharmacist by phone. She states understanding.

## 2024-06-03 DIAGNOSIS — K22.10 ULCER OF ESOPHAGUS WITHOUT BLEEDING: ICD-10-CM

## 2024-06-03 RX ORDER — FAMOTIDINE 40 MG/5ML
POWDER, FOR SUSPENSION ORAL
Qty: 300 ML | Refills: 1 | Status: SHIPPED | OUTPATIENT
Start: 2024-06-03

## 2024-07-15 RX ORDER — LANSOPRAZOLE 30 MG/1
TABLET, ORALLY DISINTEGRATING, DELAYED RELEASE ORAL
Qty: 30 TABLET | Refills: 2 | OUTPATIENT
Start: 2024-07-15

## 2024-08-08 DIAGNOSIS — K22.10 ULCER OF ESOPHAGUS WITHOUT BLEEDING: ICD-10-CM

## 2024-08-08 RX ORDER — FAMOTIDINE 40 MG/5ML
POWDER, FOR SUSPENSION ORAL
Qty: 300 ML | Refills: 1 | Status: SHIPPED | OUTPATIENT
Start: 2024-08-08

## 2024-10-01 ENCOUNTER — OFFICE VISIT (OUTPATIENT)
Dept: GASTROENTEROLOGY | Facility: CLINIC | Age: 59
End: 2024-10-01
Payer: COMMERCIAL

## 2024-10-01 VITALS
HEIGHT: 71 IN | WEIGHT: 205 LBS | OXYGEN SATURATION: 98 % | BODY MASS INDEX: 28.7 KG/M2 | HEART RATE: 88 BPM | SYSTOLIC BLOOD PRESSURE: 144 MMHG | DIASTOLIC BLOOD PRESSURE: 102 MMHG

## 2024-10-01 DIAGNOSIS — K52.9 FREQUENT STOOLS: ICD-10-CM

## 2024-10-01 DIAGNOSIS — R13.19 ESOPHAGEAL DYSPHAGIA: Primary | ICD-10-CM

## 2024-10-01 DIAGNOSIS — Z85.048 HISTORY OF RECTAL CANCER: ICD-10-CM

## 2024-10-01 DIAGNOSIS — R15.2 INCONTINENCE OF FECES WITH FECAL URGENCY: ICD-10-CM

## 2024-10-01 DIAGNOSIS — Z87.19 HISTORY OF ESOPHAGEAL ULCER: ICD-10-CM

## 2024-10-01 DIAGNOSIS — Z98.890 HISTORY OF RECTAL SURGERY: ICD-10-CM

## 2024-10-01 DIAGNOSIS — R15.9 INCONTINENCE OF FECES WITH FECAL URGENCY: ICD-10-CM

## 2024-10-01 PROCEDURE — 1160F RVW MEDS BY RX/DR IN RCRD: CPT | Performed by: PHYSICIAN ASSISTANT

## 2024-10-01 PROCEDURE — 1159F MED LIST DOCD IN RCRD: CPT | Performed by: PHYSICIAN ASSISTANT

## 2024-10-01 PROCEDURE — 99214 OFFICE O/P EST MOD 30 MIN: CPT | Performed by: PHYSICIAN ASSISTANT

## 2024-10-01 RX ORDER — BUSPIRONE HYDROCHLORIDE 5 MG/1
5 TABLET ORAL
COMMUNITY
Start: 2024-08-26

## 2024-10-01 RX ORDER — HYDROXYZINE HYDROCHLORIDE 10 MG/1
10-20 TABLET, FILM COATED ORAL 3 TIMES DAILY PRN
COMMUNITY
Start: 2024-08-15

## 2024-10-01 RX ORDER — POLYETHYLENE GLYCOL 3350 17 G/17G
17 POWDER, FOR SOLUTION ORAL DAILY PRN
Qty: 238 G | Refills: 5 | Status: SHIPPED | OUTPATIENT
Start: 2024-10-01

## 2024-10-01 NOTE — PROGRESS NOTES
Follow Up Note     Date: 10/01/2024   Patient Name: Jorgito Luis  MRN: [id number]  : 1965     Primary Care Provider: Meseret Martinez APRN     Chief Complaint   Patient presents with    Constipation    Diarrhea    Difficulty Swallowing     History of present illness:   10/1/2024  Jorgito Luis is a 59 y.o. male who is here today for follow up regarding Constipation, Diarrhea, and Difficulty Swallowing.    He did not keep previously scheduled EGD due to some improvements in dysphagia since he started swallowing ice. He thought of this while praying one day, tried it and it helped him. Each time he has some difficulty getting food down, he will swallow ice and it seems to help. No longer taking any anti-acids. No longer taking pepcid. All medications tried for reflux in the past have caused constipation and he was not able to tolerate. He does not like taking a lot of medications. He is not able to swallow pills. Takes Carafate PRN indigestion and burning when present but avoids because this causes constipation. Still has difficulty eating meats. He worries while eating that he is going to have food stuck.    He had prior colorectal surgery approx 20 years ago, reports had 18 inches removed including the rectum. Since then, he has had difficulty holding BMs, has severe fecal urgency and intermittent fecal incontinence. He has episodes of sudden onset diarrhea which is intermittent. He has to carry extra clothes and sometimes wear briefs/pads due to fear of incontinence. Sometimes he will have diffuse weakness after a severe episode of fecal urgency with several stools. He will sit on the toilet for up to an hour at times due to constant urgency. Has 3 urgent stools after each meal most days, sometimes up to 12 stools per day, soft looser stools. He has fluctuation to constipation at times. When constipated will still have 2-3 stools daily small round balls of stools.    He had an accident at work.  "Had to have toe amputation. He was diagnosed with PTSD but those medications caused a change (worse fluctuation) in his bowel habits and mostly constipation. This makes episodes of fecal urgency worse. Tried (buspar and atarax).     Interval History:  4/5/2024 inpatient GI visit  This is a 58-year-old male patient with a prior history of hypertension, hyperlipidemia, gastroesophageal reflux disease, history of rectal cancer who was presented to emergency room early this morning on 04/5/ 2024 with complaints of stuck in esophagus.     He states that he had a steak meal at about 3 PM yesterday since then has been feeling that food was stuck in the esophagus was not able to swallow saliva completely not able to keep down anything.  Had multiple episodes of nausea vomiting since then.  Finally came to emergency room further evaluation.  He does have a prior history of hiatal hernia and reflux disease.  He does have a prior history of esophagram in 2022 that revealed hiatal hernia with esophageal ring.       Has been having issues with the dysphagia for a long time.  Normally he gets issues with the food bolus stuck in the eventually will make it out.  No Prior Endoscopy.  He had a rectal cancer 20 years ago and had a surgery.  No recent colonoscopy.     Subjective     Past Medical History:   Diagnosis Date    Arthritis     Cancer 2014    colon-rectum    Difficulty in walking     GERD (gastroesophageal reflux disease)     history of    Hearing loss     Hypertension     states he takes his meds \"as needed\" based on blood pressures - 5/20/24    Memory loss     Sleep apnea     does not use CPAP     Past Surgical History:   Procedure Laterality Date    ABDOMINAL SURGERY  2014    colon cancer removed rectum - [facility]    ENDOSCOPY N/A 04/05/2024    Procedure: ESOPHAGOGASTRODUODENOSCOPY WITH FORIEGN BODY REMOVAL;  Surgeon: Jacki Perera MD;  Location: [facility] ENDOSCOPY;  Service: Gastroenterology;  Laterality: N/A; "    ILEOSTOMY CLOSURE  06/2015    [place]    KNEE ARTHROSCOPY Left     meniscus    LOW ANTERIOR BOWEL RESECTION  04/2015    with ileostomy; [place] - see care everywhere    TOE AMPUTATION Left 01/2024     Family History   Problem Relation Age of Onset    Hypertension Mother     Arthritis Mother     Cancer Mother     Diabetes Mother     Heart disease Mother     Hyperlipidemia Mother     Malig Hypertension Mother     Hypertension Father     Arthritis Father     Alcohol abuse Father     Diabetes Father     Heart disease Father     Malig Hypertension Father     Obesity Father      Social History     Socioeconomic History    Marital status:    Tobacco Use    Smoking status: Never    Smokeless tobacco: Never   Vaping Use    Vaping status: Never Used   Substance and Sexual Activity    Alcohol use: Never    Drug use: Never    Sexual activity: Defer     Current Outpatient Medications:     acetaminophen (TYLENOL) 160 MG/5ML liquid, Take  by mouth As Needed., Disp: , Rfl:     albuterol (PROVENTIL) (2.5 MG/3ML) 0.083% nebulizer solution, USE 3 ML VIA NEBULIZER THREE TIMES DAILY AS NEEDED, Disp: , Rfl:     ascorbic acid (VITAMIN C) 250 MG tablet, Take 1 tablet by mouth Daily., Disp: , Rfl:     busPIRone (BUSPAR) 5 MG tablet, Take 1 tablet by mouth., Disp: , Rfl:     CBD (cannabidiol) oral oil, Take  by mouth As Needed., Disp: , Rfl:     Cholecalciferol 25 MCG (1000 UT) tablet, Take 1 tablet by mouth Daily., Disp: , Rfl:     Hydrocortisone Max St 1 % cream, Apply  topically to the appropriate area as directed As Needed., Disp: , Rfl:     hydrOXYzine (ATARAX) 10 MG tablet, Take 1-2 tablets by mouth 3 (Three) Times a Day As Needed for Anxiety., Disp: , Rfl:     ibuprofen (ADVIL,MOTRIN) 100 MG/5ML suspension, Every 6 (Six) Hours As Needed., Disp: , Rfl:     ivermectin (STROMECTOL) 3 MG tablet tablet, As Needed., Disp: , Rfl:     L-THEANINE PO, Take  by mouth Daily., Disp: , Rfl:     Lidoderm 5 %, Place 1 patch on the  skin as directed by provider As Needed., Disp: , Rfl:     lisinopril (PRINIVIL,ZESTRIL) 10 MG tablet, Take 1 tablet by mouth As Needed (states he takes as needed based on blood pressures)., Disp: , Rfl:     losartan (COZAAR) 25 MG tablet, Take 1 tablet by mouth As Needed (pt states as needed based on blood pressure)., Disp: , Rfl:     Magnesium 400 MG tablet, Take 400 mg by mouth Daily., Disp: , Rfl:     Melatonin-Pyridoxine (MELATONIN CR PO), Take  by mouth At Night As Needed., Disp: , Rfl:     sucralfate (CARAFATE) 1 g tablet, Take 1 tablet by mouth Daily As Needed., Disp: , Rfl:     Turmeric 500 MG capsule, Take 1 capsule by mouth Daily., Disp: , Rfl:     Allergies   Allergen Reactions    Vancomycin Itching     Has to be given benadryl before he can take vancomycin    Lansoprazole Other (See Comments) and Rash     Mouth blisters    Statins Myalgia     Hurt his legs when he took them     The following portions of the patient's history were reviewed and updated as appropriate: allergies, current medications, past family history, past medical history, past social history, past surgical history and problem list.    Objective     Physical Exam  Constitutional:       General: He is not in acute distress.     Appearance: Normal appearance. He is well-developed. He is not diaphoretic.   HENT:      Head: Normocephalic and atraumatic.      Right Ear: External ear normal.      Left Ear: External ear normal.      Nose: Nose normal.   Eyes:      General: No scleral icterus.        Right eye: No discharge.         Left eye: No discharge.      Conjunctiva/sclera: Conjunctivae normal.   Neck:      Trachea: No tracheal deviation.   Pulmonary:      Effort: Pulmonary effort is normal. No respiratory distress.   Musculoskeletal:      Cervical back: Normal range of motion.      Comments: Ambulates with a cane   Skin:     Coloration: Skin is not pale.      Findings: No erythema or rash.   Neurological:      Mental Status: He is alert  "and oriented to person, place, and time.      Coordination: Coordination normal.   Psychiatric:         Mood and Affect: Mood normal.         Behavior: Behavior normal.         Thought Content: Thought content normal.         Judgment: Judgment normal.       Vitals:    10/01/24 1357   BP: (!) 144/102   Pulse: 88   SpO2: 98%   Weight: 93 kg (205 lb)   Height: 180.3 cm (71\")     Results Review:   I reviewed the patient's new clinical results.    COMPREHENSIVE METABOLIC PANEL (10/13/2023 01:26)  CBC AND DIFFERENTIAL (11/09/2023 15:15)    EGD 4/5/2024  - Food in the lower third of the esophagus. Removal was successful. - Esophageal ulcer with no stigmata of recent bleeding due to prolonged food impaction. - High-grade of narrowing Schatzki ring. Scope passed after gentle manipulation - Erythematous mucosa in the posterior wall of the stomach, antrum and prepyloric region of the stomach. - Normal duodenal bulb, first portion of the duodenum, second portion of the duodenum and third portion of the duodenum.  Patient declined any biopsies at this time    Assessment / Plan      1. Esophageal dysphagia  2. History of esophageal ulcer  Had food bolus 4/5/2024, had EGD at that time. Had Schatzki's ring noted and slight manipulation allowed scope to pass. He declined biopsies previously. Had esophageal ulcer. Unable to swallow pills. Took prevacid only for a couple of days and discontinued on his own. He is worried about medication side effects. Tried pepcid suspension without help. Has had dysphagia for many years.      Repeat EGD previously recommended, he has declined at this time    3. Incontinence of feces with fecal urgency  4. Frequent stools  5. History of rectal surgery  6. History of rectal cancer (20 years ago)  Long history of fecal urgency, intermittent fecal incontinence and frequent stools. He has anywhere from 2-12 urgent stools per day. Has intermittent diarrhea but mostly loose to soft stools daily. Urgency " worse after eating. Sometimes fecal urgency will last for an hour and comes on sporadically. He has been unable to work, travel or enjoy normal activities due to this problem. In the past, he has tried several dietary changes without relief. Had diagnosis of rectal cancer approx 20 years ago. He had rectal surgery at [place]. He reports 18 inches of colon + rectum removed at that time. Full details on this are unknown. He has not had any colonoscopy since then.     Start fiber supplement as directed  Miralax to start PRN constipation  Colonoscopy recommended, he has declined for now    - psyllium (METAMUCIL) 58.6 % powder; Take  by mouth 2 (Two) Times a Day.  Dispense: 425 g; Refill: 5      Follow Up:   Return in about 3 months (around 1/1/2025) for recheck bowel habits and GERD.    Trisha Capps PA-C  [facility]  10/1/2024  16:58 EDT    Dictated Utilizing Dragon Dictation: Part of this note may be an electronic transcription/translation of spoken language to printed text using the Dragon Dictation System.

## 2025-01-07 ENCOUNTER — TELEPHONE (OUTPATIENT)
Dept: GASTROENTEROLOGY | Facility: CLINIC | Age: 60
End: 2025-01-07

## 2025-01-07 NOTE — TELEPHONE ENCOUNTER
Caller: Jorgito Luis    Relationship to patient: Self    Best call back number: [phone]    Chief complaint: NA     Type of visit: F/U     Requested date: ASAP      If rescheduling, when is the original appointment: 01/07/25     Additional notes: PT [facility]

## 2025-01-20 ENCOUNTER — APPOINTMENT (OUTPATIENT)
Dept: CT IMAGING | Facility: HOSPITAL | Age: 60
End: 2025-01-20
Payer: COMMERCIAL

## 2025-01-20 ENCOUNTER — APPOINTMENT (OUTPATIENT)
Dept: MRI IMAGING | Facility: HOSPITAL | Age: 60
End: 2025-01-20
Payer: COMMERCIAL

## 2025-01-20 ENCOUNTER — HOSPITAL ENCOUNTER (OUTPATIENT)
Facility: HOSPITAL | Age: 60
Setting detail: OBSERVATION
Discharge: HOME | End: 2025-01-21
Attending: EMERGENCY MEDICINE | Admitting: INTERNAL MEDICINE
Payer: COMMERCIAL

## 2025-01-20 ENCOUNTER — APPOINTMENT (OUTPATIENT)
Dept: GENERAL RADIOLOGY | Facility: HOSPITAL | Age: 60
End: 2025-01-20
Payer: COMMERCIAL

## 2025-01-20 DIAGNOSIS — G37.9 DEMYELINATING CHANGES IN BRAIN: ICD-10-CM

## 2025-01-20 DIAGNOSIS — R53.1 ACUTE LEFT-SIDED WEAKNESS: Primary | ICD-10-CM

## 2025-01-20 DIAGNOSIS — I65.21 STENOSIS OF RIGHT CAROTID ARTERY: ICD-10-CM

## 2025-01-20 DIAGNOSIS — I63.9 ACUTE ISCHEMIC STROKE: ICD-10-CM

## 2025-01-20 DIAGNOSIS — R20.2 PARESTHESIAS: ICD-10-CM

## 2025-01-20 PROBLEM — F41.1 GAD (GENERALIZED ANXIETY DISORDER): Status: ACTIVE | Noted: 2025-01-20

## 2025-01-20 PROBLEM — Z89.412 STATUS POST AMPUTATION OF LEFT GREAT TOE: Status: ACTIVE | Noted: 2024-02-12

## 2025-01-20 PROBLEM — I10 ESSENTIAL HYPERTENSION: Status: ACTIVE | Noted: 2025-01-20

## 2025-01-20 PROBLEM — E78.5 HYPERLIPIDEMIA: Status: ACTIVE | Noted: 2025-01-20

## 2025-01-20 PROBLEM — R29.898 LEFT ARM WEAKNESS: Status: ACTIVE | Noted: 2025-01-20

## 2025-01-20 LAB
APTT PPP: 30 SECONDS (ref 22–39)
BASOPHILS # BLD AUTO: 0.08 10*3/MM3 (ref 0–0.2)
BASOPHILS NFR BLD AUTO: 1 % (ref 0–1.5)
BUN BLDA-MCNC: 15 MG/DL (ref 8–26)
CA-I BLDA-SCNC: 1.22 MMOL/L (ref 1.2–1.32)
CHLORIDE BLDA-SCNC: 103 MMOL/L (ref 98–109)
CO2 BLDA-SCNC: 25 MMOL/L (ref 24–29)
CREAT BLDA-MCNC: 0.8 MG/DL (ref 0.6–1.3)
DEPRECATED RDW RBC AUTO: 41.4 FL (ref 37–54)
EGFRCR SERPLBLD CKD-EPI 2021: 102.6 ML/MIN/1.73
EOSINOPHIL # BLD AUTO: 0.17 10*3/MM3 (ref 0–0.4)
EOSINOPHIL NFR BLD AUTO: 2.1 % (ref 0.3–6.2)
ERYTHROCYTE [DISTWIDTH] IN BLOOD BY AUTOMATED COUNT: 12.8 % (ref 12.3–15.4)
GLUCOSE BLDC GLUCOMTR-MCNC: 94 MG/DL (ref 70–130)
HCT VFR BLD AUTO: 47.1 % (ref 37.5–51)
HCT VFR BLDA CALC: 49 % (ref 38–51)
HGB BLD-MCNC: 16.1 G/DL (ref 13–17.7)
HGB BLDA-MCNC: 16.7 G/DL (ref 12–17)
HOLD SPECIMEN: NORMAL
IMM GRANULOCYTES # BLD AUTO: 0.03 10*3/MM3 (ref 0–0.05)
IMM GRANULOCYTES NFR BLD AUTO: 0.4 % (ref 0–0.5)
INR PPP: 0.95 (ref 0.89–1.12)
LYMPHOCYTES # BLD AUTO: 1.45 10*3/MM3 (ref 0.7–3.1)
LYMPHOCYTES NFR BLD AUTO: 18.1 % (ref 19.6–45.3)
MCH RBC QN AUTO: 30.1 PG (ref 26.6–33)
MCHC RBC AUTO-ENTMCNC: 34.2 G/DL (ref 31.5–35.7)
MCV RBC AUTO: 88 FL (ref 79–97)
MONOCYTES # BLD AUTO: 0.53 10*3/MM3 (ref 0.1–0.9)
MONOCYTES NFR BLD AUTO: 6.6 % (ref 5–12)
NEUTROPHILS NFR BLD AUTO: 5.74 10*3/MM3 (ref 1.7–7)
NEUTROPHILS NFR BLD AUTO: 71.8 % (ref 42.7–76)
NRBC BLD AUTO-RTO: 0 /100 WBC (ref 0–0.2)
PLATELET # BLD AUTO: 315 10*3/MM3 (ref 140–450)
PMV BLD AUTO: 10 FL (ref 6–12)
POTASSIUM BLDA-SCNC: 4.1 MMOL/L (ref 3.5–4.9)
PROTHROMBIN TIME: 12.7 SECONDS (ref 12.2–14.5)
RBC # BLD AUTO: 5.35 10*6/MM3 (ref 4.14–5.8)
SODIUM BLD-SCNC: 139 MMOL/L (ref 138–146)
WBC NRBC COR # BLD AUTO: 8 10*3/MM3 (ref 3.4–10.8)
WHOLE BLOOD HOLD COAG: NORMAL
WHOLE BLOOD HOLD SPECIMEN: NORMAL

## 2025-01-20 PROCEDURE — G0378 HOSPITAL OBSERVATION PER HR: HCPCS

## 2025-01-20 PROCEDURE — 25510000001 IOPAMIDOL PER 1 ML: Performed by: EMERGENCY MEDICINE

## 2025-01-20 PROCEDURE — 0042T HC CT CEREBRAL PERFUSION W/WO CONTRAST: CPT

## 2025-01-20 PROCEDURE — 70498 CT ANGIOGRAPHY NECK: CPT

## 2025-01-20 PROCEDURE — 70450 CT HEAD/BRAIN W/O DYE: CPT

## 2025-01-20 PROCEDURE — 93005 ELECTROCARDIOGRAM TRACING: CPT | Performed by: EMERGENCY MEDICINE

## 2025-01-20 PROCEDURE — 85014 HEMATOCRIT: CPT | Performed by: EMERGENCY MEDICINE

## 2025-01-20 PROCEDURE — 85025 COMPLETE CBC W/AUTO DIFF WBC: CPT | Performed by: EMERGENCY MEDICINE

## 2025-01-20 PROCEDURE — 99222 1ST HOSP IP/OBS MODERATE 55: CPT | Performed by: INTERNAL MEDICINE

## 2025-01-20 PROCEDURE — 99285 EMERGENCY DEPT VISIT HI MDM: CPT

## 2025-01-20 PROCEDURE — 71045 X-RAY EXAM CHEST 1 VIEW: CPT

## 2025-01-20 PROCEDURE — 85730 THROMBOPLASTIN TIME PARTIAL: CPT | Performed by: EMERGENCY MEDICINE

## 2025-01-20 PROCEDURE — 85610 PROTHROMBIN TIME: CPT | Performed by: EMERGENCY MEDICINE

## 2025-01-20 PROCEDURE — 72141 MRI NECK SPINE W/O DYE: CPT

## 2025-01-20 PROCEDURE — 80047 BASIC METABLC PNL IONIZED CA: CPT | Performed by: EMERGENCY MEDICINE

## 2025-01-20 PROCEDURE — 70496 CT ANGIOGRAPHY HEAD: CPT

## 2025-01-20 PROCEDURE — 70551 MRI BRAIN STEM W/O DYE: CPT

## 2025-01-20 RX ORDER — SODIUM CHLORIDE 0.9 % (FLUSH) 0.9 %
10 SYRINGE (ML) INJECTION EVERY 12 HOURS SCHEDULED
Status: DISCONTINUED | OUTPATIENT
Start: 2025-01-20 | End: 2025-01-21 | Stop reason: HOSPADM

## 2025-01-20 RX ORDER — ASCORBIC ACID 500 MG
250 TABLET ORAL DAILY
Status: DISCONTINUED | OUTPATIENT
Start: 2025-01-21 | End: 2025-01-21 | Stop reason: HOSPADM

## 2025-01-20 RX ORDER — CLOPIDOGREL BISULFATE 75 MG/1
75 TABLET ORAL DAILY
Status: DISCONTINUED | OUTPATIENT
Start: 2025-01-21 | End: 2025-01-21 | Stop reason: HOSPADM

## 2025-01-20 RX ORDER — CLOPIDOGREL BISULFATE 75 MG/1
300 TABLET ORAL ONCE
Status: COMPLETED | OUTPATIENT
Start: 2025-01-20 | End: 2025-01-20

## 2025-01-20 RX ORDER — ASPIRIN 300 MG/1
300 SUPPOSITORY RECTAL DAILY
Status: DISCONTINUED | OUTPATIENT
Start: 2025-01-21 | End: 2025-01-21 | Stop reason: HOSPADM

## 2025-01-20 RX ORDER — SODIUM CHLORIDE 9 MG/ML
40 INJECTION, SOLUTION INTRAVENOUS AS NEEDED
Status: DISCONTINUED | OUTPATIENT
Start: 2025-01-20 | End: 2025-01-21 | Stop reason: HOSPADM

## 2025-01-20 RX ORDER — ASPIRIN 81 MG/1
81 TABLET, CHEWABLE ORAL DAILY
Status: DISCONTINUED | OUTPATIENT
Start: 2025-01-21 | End: 2025-01-21 | Stop reason: HOSPADM

## 2025-01-20 RX ORDER — SODIUM CHLORIDE 0.9 % (FLUSH) 0.9 %
10 SYRINGE (ML) INJECTION AS NEEDED
Status: DISCONTINUED | OUTPATIENT
Start: 2025-01-20 | End: 2025-01-21 | Stop reason: HOSPADM

## 2025-01-20 RX ORDER — BUSPIRONE HYDROCHLORIDE 10 MG/1
5 TABLET ORAL EVERY 12 HOURS SCHEDULED
Status: DISCONTINUED | OUTPATIENT
Start: 2025-01-21 | End: 2025-01-21 | Stop reason: HOSPADM

## 2025-01-20 RX ORDER — ATORVASTATIN CALCIUM 40 MG/1
80 TABLET, FILM COATED ORAL NIGHTLY
Status: DISCONTINUED | OUTPATIENT
Start: 2025-01-20 | End: 2025-01-21

## 2025-01-20 RX ORDER — ALBUTEROL SULFATE 0.83 MG/ML
2.5 SOLUTION RESPIRATORY (INHALATION) EVERY 6 HOURS PRN
Status: DISCONTINUED | OUTPATIENT
Start: 2025-01-20 | End: 2025-01-21 | Stop reason: HOSPADM

## 2025-01-20 RX ORDER — IOPAMIDOL 755 MG/ML
115 INJECTION, SOLUTION INTRAVASCULAR
Status: COMPLETED | OUTPATIENT
Start: 2025-01-20 | End: 2025-01-20

## 2025-01-20 RX ADMIN — CLOPIDOGREL BISULFATE 300 MG: 75 TABLET ORAL at 22:17

## 2025-01-20 RX ADMIN — Medication 10 ML: at 22:21

## 2025-01-20 RX ADMIN — IOPAMIDOL 115 ML: 755 INJECTION, SOLUTION INTRAVENOUS at 18:06

## 2025-01-20 NOTE — ED PROVIDER NOTES
"Subjective   History of Present Illness  Patient is a pleasant 59-year-old male who presents to the emergency department with left arm send paresthesia.  He says that 4-5 times throughout the past 1 week he has had episodes of left arm weakness and paresthesia.  Sometimes have difficulty lifting the arm.  He had notes that on 1 occasion he had slight paresthesia in his left leg.  Denies facial symptoms.  Generally the episodes were lasting approximately 10 minutes.  Today the episode lasted longer and the fact that it was continued to occur prompted their visit to the emergency department.  Denies other recent illness.  Denies fever, chills, chest pain, shortness of breath, abdominal pain, vomiting, diarrhea, or other acute complaint.      Review of Systems   All other systems reviewed and are negative.      Past Medical History:   Diagnosis Date    Arthritis     Cancer 2014    colon-rectum    Difficulty in walking     GERD (gastroesophageal reflux disease)     history of    Hearing loss     Hypertension     states he takes his meds \"as needed\" based on blood pressures - 5/20/24    Memory loss     Sleep apnea     does not use CPAP       Allergies   Allergen Reactions    Vancomycin Itching     Has to be given benadryl before he can take vancomycin    Lansoprazole Other (See Comments) and Rash     Mouth blisters    Statins Myalgia     Hurt his legs when he took them       Past Surgical History:   Procedure Laterality Date    ABDOMINAL SURGERY  2014    colon cancer removed rectum - [facility]    ENDOSCOPY N/A 04/05/2024    Procedure: ESOPHAGOGASTRODUODENOSCOPY WITH FORIEGN BODY REMOVAL;  Surgeon: Jacki Perera MD;  Location: [facility] ENDOSCOPY;  Service: Gastroenterology;  Laterality: N/A;    ILEOSTOMY CLOSURE  06/2015    [facility]    KNEE ARTHROSCOPY Left     meniscus    LOW ANTERIOR BOWEL RESECTION  04/2015    with ileostomy; [facility] - see care everywhere    TOE AMPUTATION Left 01/2024       Family " History   Problem Relation Age of Onset    Hypertension Mother     Arthritis Mother     Cancer Mother     Diabetes Mother     Heart disease Mother     Hyperlipidemia Mother     Malig Hypertension Mother     Hypertension Father     Arthritis Father     Alcohol abuse Father     Diabetes Father     Heart disease Father     Malig Hypertension Father     Obesity Father        Social History     Socioeconomic History    Marital status:    Tobacco Use    Smoking status: Never    Smokeless tobacco: Never   Vaping Use    Vaping status: Never Used   Substance and Sexual Activity    Alcohol use: Never    Drug use: Never    Sexual activity: Defer           Objective   Physical Exam  Vitals and nursing note reviewed.   Constitutional:       General: He is not in acute distress.  HENT:      Head: Normocephalic and atraumatic.      Mouth/Throat:      Mouth: Mucous membranes are moist.   Eyes:      Conjunctiva/sclera: Conjunctivae normal.      Pupils: Pupils are equal, round, and reactive to light.   Cardiovascular:      Rate and Rhythm: Normal rate and regular rhythm.      Heart sounds: Normal heart sounds.   Pulmonary:      Effort: No respiratory distress.      Breath sounds: Normal breath sounds.   Abdominal:      Palpations: Abdomen is soft.      Tenderness: There is no abdominal tenderness.   Musculoskeletal:         General: Normal range of motion.      Cervical back: Normal range of motion and neck supple.   Skin:     General: Skin is warm and dry.      Capillary Refill: Capillary refill takes less than 2 seconds.   Neurological:      General: No focal deficit present.      Mental Status: He is alert and oriented to person, place, and time.      Cranial Nerves: No cranial nerve deficit.      Sensory: No sensory deficit.      Comments: No pronator drift   Normal finger to nose and heel to shin    Psychiatric:         Behavior: Behavior normal.         Thought Content: Thought content normal.       Procedures            ED Course       Latest Reference Range & Units 01/20/25 15:54 01/20/25 15:57   Total CO2 24 - 29 mmol/L  25   Sodium 138 - 146 mmol/L  139   Potassium 3.5 - 4.9 mmol/L  4.1   Chloride 98 - 109 mmol/L  103   BUN 8 - 26 mg/dL  15   Creatinine 0.60 - 1.30 mg/dL  0.80   eGFR >60.0 mL/min/1.73  102.6   Glucose 70 - 130 mg/dL  94   Ionized Calcium 1.20 - 1.32 mmol/L  1.22   Protime 12.2 - 14.5 Seconds 12.7    INR 0.89 - 1.12  0.95    PTT 22.0 - 39.0 seconds 30.0    WBC 3.40 - 10.80 10*3/mm3 8.00    RBC 4.14 - 5.80 10*6/mm3 5.35    Hemoglobin 13.0 - 17.7 g/dL 16.1    Hemoglobin 12.0 - 17.0 g/dL  16.7   Hematocrit 37.5 - 51.0 % 47.1    Hematocrit 38 - 51 %  49   Platelets 140 - 450 10*3/mm3 315    RDW 12.3 - 15.4 % 12.8    MCV 79.0 - 97.0 fL 88.0    MCH 26.6 - 33.0 pg 30.1    MCHC 31.5 - 35.7 g/dL 34.2    MPV 6.0 - 12.0 fL 10.0    RDW-SD 37.0 - 54.0 fl 41.4    Neutrophil Rel % 42.7 - 76.0 % 71.8    Lymphocyte Rel % 19.6 - 45.3 % 18.1 (L)    Monocyte Rel % 5.0 - 12.0 % 6.6    Eosinophil Rel % 0.3 - 6.2 % 2.1    Basophil Rel % 0.0 - 1.5 % 1.0    Immature Granulocyte Rel % 0.0 - 0.5 % 0.4    Neutrophils Absolute 1.70 - 7.00 10*3/mm3 5.74    Lymphocytes Absolute 0.70 - 3.10 10*3/mm3 1.45    Monocytes Absolute 0.10 - 0.90 10*3/mm3 0.53    Eosinophils Absolute 0.00 - 0.40 10*3/mm3 0.17    Basophils Absolute 0.00 - 0.20 10*3/mm3 0.08    Immature Grans, Absolute 0.00 - 0.05 10*3/mm3 0.03    nRBC 0.0 - 0.2 /100 WBC 0.0    (L): Data is abnormally low    MRI Thoracic Spine With & Without Contrast   Final Result   Impression:   The addition of postcontrast sequences demonstrates no enhancement associated with the diffusion restricting ringlike area of T2 hyperintensity seen within the corpus callosum on noncontrast exam. The appearance of this lesion remains most concerning for    an area of potential subacute demyelination.      Additional tiny areas of diffusion restriction seen along cortical margins of the right pre and  postcentral gyrus demonstrate corresponding enhancement, favoring areas of acute infarct. While neoplasm is considered significantly less likely, follow-up    contrast-enhanced MRI could be useful to ensure expected evolution of presumed small areas of infarct.      No evidence of demyelinating process or other focal signal abnormality of the thoracic spinal cord, conus medullaris and cauda equina nerve roots. Minimal thoracic and lumbar spondylosis is present, without associated areas of significant spinal canal or    neuroforaminal narrowing.            Electronically Signed: Alfred Choudhury MD     1/21/2025 5:47 AM EST     Workstation ID: [id number]      MRI Lumbar Spine With & Without Contrast   Final Result   Impression:   The addition of postcontrast sequences demonstrates no enhancement associated with the diffusion restricting ringlike area of T2 hyperintensity seen within the corpus callosum on noncontrast exam. The appearance of this lesion remains most concerning for    an area of potential subacute demyelination.      Additional tiny areas of diffusion restriction seen along cortical margins of the right pre and postcentral gyrus demonstrate corresponding enhancement, favoring areas of acute infarct. While neoplasm is considered significantly less likely, follow-up    contrast-enhanced MRI could be useful to ensure expected evolution of presumed small areas of infarct.      No evidence of demyelinating process or other focal signal abnormality of the thoracic spinal cord, conus medullaris and cauda equina nerve roots. Minimal thoracic and lumbar spondylosis is present, without associated areas of significant spinal canal or    neuroforaminal narrowing.            Electronically Signed: Alfred Choudhury MD     1/21/2025 5:47 AM EST     Workstation ID: [id number]      MRI Brain With & Without Contrast   Final Result   Impression:   The addition of postcontrast sequences demonstrates no enhancement associated with  the diffusion restricting ringlike area of T2 hyperintensity seen within the corpus callosum on noncontrast exam. The appearance of this lesion remains most concerning for    an area of potential subacute demyelination.      Additional tiny areas of diffusion restriction seen along cortical margins of the right pre and postcentral gyrus demonstrate corresponding enhancement, favoring areas of acute infarct. While neoplasm is considered significantly less likely, follow-up    contrast-enhanced MRI could be useful to ensure expected evolution of presumed small areas of infarct.      No evidence of demyelinating process or other focal signal abnormality of the thoracic spinal cord, conus medullaris and cauda equina nerve roots. Minimal thoracic and lumbar spondylosis is present, without associated areas of significant spinal canal or    neuroforaminal narrowing.            Electronically Signed: Alfred Choudhury MD     1/21/2025 5:47 AM EST     Workstation ID: [id number]      CT Head Without Contrast   Final Result   Impression:      1. No acute intracranial abnormality.      Findings called to [facility] with stroke team at 6:28 p.m. on 1/20/2025            Electronically Signed: Morro Brantley MD     1/20/2025 6:28 PM EST     Workstation ID: [id number]      CT CEREBRAL PERFUSION WITH & WITHOUT CONTRAST   Final Result   There is between 60 and 80% narrowing of the proximal right internal carotid artery. No vessel occlusion is seen. No evidence of infarct on CT perfusion.               Electronically Signed: Genaro Fish MD     1/20/2025 6:34 PM EST     Workstation ID: [id number]      CT Angiogram Head   Final Result   There is between 60 and 80% narrowing of the proximal right internal carotid artery. No vessel occlusion is seen. No evidence of infarct on CT perfusion.               Electronically Signed: Genaro Fish MD     1/20/2025 6:34 PM EST     Workstation ID: [id number]      CT Angiogram Neck   Final Result   There is between  60 and 80% narrowing of the proximal right internal carotid artery. No vessel occlusion is seen. No evidence of infarct on CT perfusion.               Electronically Signed: Genaro Fish MD     1/20/2025 6:34 PM EST     Workstation ID: [id number]      MRI Cervical Spine Without Contrast   Final Result   Impression:      1. Minimal degenerative disc disease and degenerative facet change resulting in mild multilevel neural foraminal narrowing. No spinal canal stenosis.   2. Normal signal seen throughout the substance of the visualized spinal cord.            Electronically Signed: Morro Brantley MD     1/20/2025 6:04 PM EST     Workstation ID: [id number]      MRI Brain Without Contrast   Final Result   Addendum (preliminary) 1 of 1   ADDENDUM #1   Tiny foci of restricted diffusion with ADC signal dropout noted involving    the cortical gray matter of the left frontal lobe, the right parietal    lobe, and with hazy involvement of the right frontal lobe. These are    concerning for tiny infarcts as well. A    voice recognition error is present in the impression of the ringlike    structure. The examination was performed without contrast and therefore it    represents a ringlike area of increased T2 or FLAIR signal and not ring    enhancement.      Electronically Signed: Genaro Fish MD     1/20/2025 9:50 PM EST     Workstation ID: [id number]   ORIGINAL REPORT:   MRI BRAIN WO CONTRAST      Date of Exam: 1/20/2025 4:50 PM EST      Indication: left sided weakness.       Comparison: None available.      Technique:  Routine multiplanar/multisequence sequence images of the brain    were obtained without contrast administration.      Findings: Ringlike areas of questionable restricted diffusion versus T2    shine through and ADC signal dropout involving the right corpus callosum    seen on FLAIR image #17 series 6. No associated blood products. Multifocal    areas of T2/FLAIR signal increase    are noted within the subcortical, deep  "cerebral, and periventricular white    matter consistent with chronic small vessel/microangiopathic ischemic    changes. The ventricles and sulci are normal in size and configuration. .    No extra-axial mass or collection.    The posterior fossa is normal. Sellar and suprasellar structures are    normal. The major intracranial flow-voids of the [place] are    patent.      Orbital and periorbital soft tissues are normal. The visualized paranasal    sinuses and ethmoid air cells are aerated. The mastoid air cells are    aerated.. 2.8 cm x 0.7 cm fatty lesion arising from the outer table of the    skull consistent with a scalp    lipoma.      Impression: Ring-enhancing area of possible restricted diffusion versus T2    shine through on ADC signal changes involving the right corpus callosum.    This may represent a small infarct. Consider an MRI of the brain with    contrast to exclude demyelinating    disease or much less likely an underlying mass.         Electronically Signed: Genaro Fish MD     1/20/2025 5:34 PM EST     Workstation ID: [id number]      Final   Impression: Ring-enhancing area of possible restricted diffusion versus T2 shine through on ADC signal changes involving the right corpus callosum. This may represent a small infarct. Consider an MRI of the brain with contrast to exclude demyelinating    disease or much less likely an underlying mass.         Electronically Signed: Genaro Fish MD     1/20/2025 5:34 PM EST     Workstation ID: [id number]      XR Chest 1 View   Final Result   Impression:   No evidence of active chest disease.         Electronically Signed: Desmond Edmondson MD     1/20/2025 4:26 PM EST     Workstation ID: [id number]        Vitals:    01/20/25 1527   BP: (!) 181/106   BP Location: Left arm   Patient Position: Sitting   Pulse: 78   Resp: 18   Temp: 98 °F (36.7 °C)   TempSrc: Oral   SpO2: 97%   Weight: 93.2 kg (205 lb 7.5 oz)   Height: 180.3 cm (71\")     Medications   sodium chloride " 0.9 % flush 10 mL (has no administration in time range)     ECG/EMG Results (last 24 hours)       ** No results found for the last 24 hours. **          ECG 12 Lead Stroke Evaluation    (Results Pending)                                                        Medical Decision Making  Problems Addressed:  Acute ischemic stroke: complicated acute illness or injury  Acute left-sided weakness: complicated acute illness or injury  Paresthesias: complicated acute illness or injury  Stenosis of right carotid artery: complicated acute illness or injury    Amount and/or Complexity of Data Reviewed  External Data Reviewed: notes.  Labs: ordered. Decision-making details documented in ED Course.  Radiology: ordered and independent interpretation performed. Decision-making details documented in ED Course.  ECG/medicine tests: ordered and independent interpretation performed. Decision-making details documented in ED Course.    Risk  Prescription drug management.  Decision regarding hospitalization.        Final diagnoses:   Acute left-sided weakness   Paresthesias   Acute ischemic stroke   Stenosis of right carotid artery       ED Disposition  ED Disposition       ED Disposition   Decision to Admit    Condition   --    Comment   Level of Care: Telemetry [5]   Diagnosis: Left arm weakness [282737]   Admitting Physician: CHRISTIAN GARG [[id number]]   Attending Physician: CHRISTIAN GARG [[id number]]   Is patient appropriate for Inpatient Observation Unit?: No [0]                 No follow-up provider specified.       Medication List      No changes were made to your prescriptions during this visit.            Vin Pearce DO  01/27/25 0123

## 2025-01-21 ENCOUNTER — APPOINTMENT (OUTPATIENT)
Dept: CARDIOLOGY | Facility: HOSPITAL | Age: 60
End: 2025-01-21
Payer: COMMERCIAL

## 2025-01-21 ENCOUNTER — APPOINTMENT (OUTPATIENT)
Dept: MRI IMAGING | Facility: HOSPITAL | Age: 60
End: 2025-01-21
Payer: COMMERCIAL

## 2025-01-21 VITALS
HEART RATE: 67 BPM | WEIGHT: 205.47 LBS | SYSTOLIC BLOOD PRESSURE: 146 MMHG | DIASTOLIC BLOOD PRESSURE: 94 MMHG | TEMPERATURE: 98 F | OXYGEN SATURATION: 95 % | RESPIRATION RATE: 14 BRPM | HEIGHT: 71 IN | BODY MASS INDEX: 28.77 KG/M2

## 2025-01-21 LAB
AV MEAN PRESS GRAD SYS DOP V1V2: 4 MMHG
AV VMAX SYS DOP: 134 CM/SEC
BH CV ECHO MEAS - AO MAX PG: 7.2 MMHG
BH CV ECHO MEAS - AO ROOT DIAM: 3.2 CM
BH CV ECHO MEAS - AO V2 VTI: 27.8 CM
BH CV ECHO MEAS - AVA(I,D): 2.02 CM2
BH CV ECHO MEAS - EDV(CUBED): 91.1 ML
BH CV ECHO MEAS - EDV(MOD-SP2): 70.5 ML
BH CV ECHO MEAS - EDV(MOD-SP4): 78.8 ML
BH CV ECHO MEAS - EF(MOD-SP2): 57 %
BH CV ECHO MEAS - EF(MOD-SP4): 64.8 %
BH CV ECHO MEAS - ESV(CUBED): 24.4 ML
BH CV ECHO MEAS - ESV(MOD-SP2): 30.3 ML
BH CV ECHO MEAS - ESV(MOD-SP4): 27.7 ML
BH CV ECHO MEAS - FS: 35.6 %
BH CV ECHO MEAS - IVS/LVPW: 0.9 CM
BH CV ECHO MEAS - IVSD: 0.9 CM
BH CV ECHO MEAS - LA DIMENSION: 3.9 CM
BH CV ECHO MEAS - LAT PEAK E' VEL: 10.9 CM/SEC
BH CV ECHO MEAS - LV MASS(C)D: 142.9 GRAMS
BH CV ECHO MEAS - LV MAX PG: 2.5 MMHG
BH CV ECHO MEAS - LV MEAN PG: 1 MMHG
BH CV ECHO MEAS - LV V1 MAX: 79 CM/SEC
BH CV ECHO MEAS - LV V1 VTI: 17.9 CM
BH CV ECHO MEAS - LVIDD: 4.5 CM
BH CV ECHO MEAS - LVIDS: 2.9 CM
BH CV ECHO MEAS - LVOT AREA: 3.1 CM2
BH CV ECHO MEAS - LVOT DIAM: 2 CM
BH CV ECHO MEAS - LVPWD: 1 CM
BH CV ECHO MEAS - MED PEAK E' VEL: 7.6 CM/SEC
BH CV ECHO MEAS - MV A MAX VEL: 99.2 CM/SEC
BH CV ECHO MEAS - MV DEC SLOPE: 305 CM/SEC2
BH CV ECHO MEAS - MV DEC TIME: 0.27 SEC
BH CV ECHO MEAS - MV E MAX VEL: 98.5 CM/SEC
BH CV ECHO MEAS - MV E/A: 0.99
BH CV ECHO MEAS - MV MAX PG: 5.1 MMHG
BH CV ECHO MEAS - MV MEAN PG: 2 MMHG
BH CV ECHO MEAS - MV P1/2T: 97 MSEC
BH CV ECHO MEAS - MV V2 VTI: 38.5 CM
BH CV ECHO MEAS - MVA(P1/2T): 2.27 CM2
BH CV ECHO MEAS - MVA(VTI): 1.46 CM2
BH CV ECHO MEAS - PA ACC TIME: 0.16 SEC
BH CV ECHO MEAS - RAP SYSTOLE: 8 MMHG
BH CV ECHO MEAS - SV(LVOT): 56.2 ML
BH CV ECHO MEAS - SV(MOD-SP2): 40.2 ML
BH CV ECHO MEAS - SV(MOD-SP4): 51.1 ML
BH CV ECHO MEAS - TAPSE (>1.6): 1.9 CM
BH CV ECHO MEASUREMENTS AVERAGE E/E' RATIO: 10.65
BH CV ECHO SHUNT ASSESSMENT PERFORMED (HIDDEN SCRIPTING): 1
BH CV VAS BP LEFT ARM: NORMAL MMHG
BH CV XLRA - RV BASE: 3.8 CM
BH CV XLRA - RV LENGTH: 7.2 CM
BH CV XLRA - RV MID: 2.9 CM
BH CV XLRA - TDI S': 11.5 CM/SEC
BH CV XLRA MEAS LEFT DIST CCA EDV: 27.7 CM/SEC
BH CV XLRA MEAS LEFT DIST CCA PSV: 89.2 CM/SEC
BH CV XLRA MEAS LEFT DIST ICA EDV: 29.8 CM/SEC
BH CV XLRA MEAS LEFT DIST ICA PSV: 77 CM/SEC
BH CV XLRA MEAS LEFT ICA/CCA RATIO: 0.85
BH CV XLRA MEAS LEFT MID CCA EDV: 28.6 CM/SEC
BH CV XLRA MEAS LEFT MID CCA PSV: 96.2 CM/SEC
BH CV XLRA MEAS LEFT MID ICA EDV: 30.4 CM/SEC
BH CV XLRA MEAS LEFT MID ICA PSV: 82 CM/SEC
BH CV XLRA MEAS LEFT PROX CCA EDV: 32.1 CM/SEC
BH CV XLRA MEAS LEFT PROX CCA PSV: 101 CM/SEC
BH CV XLRA MEAS LEFT PROX ECA PSV: 199 CM/SEC
BH CV XLRA MEAS LEFT PROX ICA EDV: 21.7 CM/SEC
BH CV XLRA MEAS LEFT PROX ICA PSV: 62.1 CM/SEC
BH CV XLRA MEAS LEFT PROX SCLA PSV: 170 CM/SEC
BH CV XLRA MEAS LEFT VERTEBRAL A PSV: 64.5 CM/SEC
BH CV XLRA MEAS RIGHT DIST CCA EDV: 22 CM/SEC
BH CV XLRA MEAS RIGHT DIST CCA PSV: 68.6 CM/SEC
BH CV XLRA MEAS RIGHT DIST ICA EDV: 41.6 CM/SEC
BH CV XLRA MEAS RIGHT DIST ICA PSV: 154 CM/SEC
BH CV XLRA MEAS RIGHT ICA/CCA RATIO: 2.7
BH CV XLRA MEAS RIGHT MID CCA EDV: 17.6 CM/SEC
BH CV XLRA MEAS RIGHT MID CCA PSV: 69.1 CM/SEC
BH CV XLRA MEAS RIGHT MID ICA EDV: 53.3 CM/SEC
BH CV XLRA MEAS RIGHT MID ICA PSV: 167 CM/SEC
BH CV XLRA MEAS RIGHT PROX CCA EDV: 17 CM/SEC
BH CV XLRA MEAS RIGHT PROX CCA PSV: 60.9 CM/SEC
BH CV XLRA MEAS RIGHT PROX ECA PSV: 156 CM/SEC
BH CV XLRA MEAS RIGHT PROX ICA EDV: 64.9 CM/SEC
BH CV XLRA MEAS RIGHT PROX ICA PSV: 184 CM/SEC
BH CV XLRA MEAS RIGHT PROX SCLA PSV: 122 CM/SEC
BH CV XLRA MEAS RIGHT VERTEBRAL A PSV: 51.1 CM/SEC
CHOLEST SERPL-MCNC: 422 MG/DL (ref 0–200)
CRP SERPL-MCNC: 0.42 MG/DL (ref 0–0.5)
ERYTHROCYTE [SEDIMENTATION RATE] IN BLOOD: 16 MM/HR (ref 0–20)
GLUCOSE BLDC GLUCOMTR-MCNC: 113 MG/DL (ref 70–130)
GLUCOSE BLDC GLUCOMTR-MCNC: 93 MG/DL (ref 70–130)
HBA1C MFR BLD: 5.3 % (ref 4.8–5.6)
HDLC SERPL-MCNC: 40 MG/DL (ref 40–60)
IVRT: 77 MS
LDLC SERPL CALC-MCNC: 357 MG/DL (ref 0–100)
LDLC/HDLC SERPL: 8.89 {RATIO}
LEFT ARM BP: NORMAL MMHG
LEFT ATRIUM VOLUME INDEX: 19.4 ML/M2
LEFT ATRIUM VOLUME: 41.3 ML
LV EF BIPLANE MOD: 60.4 %
QT INTERVAL: 400 MS
QTC INTERVAL: 422 MS
TRIGL SERPL-MCNC: 132 MG/DL (ref 0–150)
VLDLC SERPL-MCNC: 25 MG/DL (ref 5–40)

## 2025-01-21 PROCEDURE — A9577 INJ MULTIHANCE: HCPCS | Performed by: INTERNAL MEDICINE

## 2025-01-21 PROCEDURE — 25510000002 GADOBENATE DIMEGLUMINE 529 MG/ML SOLUTION: Performed by: INTERNAL MEDICINE

## 2025-01-21 PROCEDURE — 97161 PT EVAL LOW COMPLEX 20 MIN: CPT

## 2025-01-21 PROCEDURE — 97165 OT EVAL LOW COMPLEX 30 MIN: CPT

## 2025-01-21 PROCEDURE — G0378 HOSPITAL OBSERVATION PER HR: HCPCS

## 2025-01-21 PROCEDURE — 99214 OFFICE O/P EST MOD 30 MIN: CPT | Performed by: STUDENT IN AN ORGANIZED HEALTH CARE EDUCATION/TRAINING PROGRAM

## 2025-01-21 PROCEDURE — 80061 LIPID PANEL: CPT | Performed by: NURSE PRACTITIONER

## 2025-01-21 PROCEDURE — 93306 TTE W/DOPPLER COMPLETE: CPT

## 2025-01-21 PROCEDURE — 92610 EVALUATE SWALLOWING FUNCTION: CPT

## 2025-01-21 PROCEDURE — 83036 HEMOGLOBIN GLYCOSYLATED A1C: CPT | Performed by: NURSE PRACTITIONER

## 2025-01-21 PROCEDURE — 93880 EXTRACRANIAL BILAT STUDY: CPT | Performed by: INTERNAL MEDICINE

## 2025-01-21 PROCEDURE — 92523 SPEECH SOUND LANG COMPREHEN: CPT

## 2025-01-21 PROCEDURE — 99239 HOSP IP/OBS DSCHRG MGMT >30: CPT | Performed by: INTERNAL MEDICINE

## 2025-01-21 PROCEDURE — 82948 REAGENT STRIP/BLOOD GLUCOSE: CPT

## 2025-01-21 PROCEDURE — 85652 RBC SED RATE AUTOMATED: CPT | Performed by: PSYCHIATRY & NEUROLOGY

## 2025-01-21 PROCEDURE — 70553 MRI BRAIN STEM W/O & W/DYE: CPT

## 2025-01-21 PROCEDURE — 72158 MRI LUMBAR SPINE W/O & W/DYE: CPT

## 2025-01-21 PROCEDURE — 72157 MRI CHEST SPINE W/O & W/DYE: CPT

## 2025-01-21 PROCEDURE — 93880 EXTRACRANIAL BILAT STUDY: CPT

## 2025-01-21 PROCEDURE — 86140 C-REACTIVE PROTEIN: CPT | Performed by: PSYCHIATRY & NEUROLOGY

## 2025-01-21 PROCEDURE — 93306 TTE W/DOPPLER COMPLETE: CPT | Performed by: INTERNAL MEDICINE

## 2025-01-21 RX ORDER — ROSUVASTATIN CALCIUM 40 MG/1
40 TABLET, COATED ORAL NIGHTLY
Qty: 90 TABLET | Refills: 0 | Status: SHIPPED | OUTPATIENT
Start: 2025-01-21

## 2025-01-21 RX ORDER — EZETIMIBE 10 MG/1
10 TABLET ORAL DAILY
Qty: 30 TABLET | Refills: 0 | Status: SHIPPED | OUTPATIENT
Start: 2025-01-21 | End: 2025-02-20

## 2025-01-21 RX ORDER — ASPIRIN 81 MG/1
81 TABLET, CHEWABLE ORAL DAILY
Qty: 60 TABLET | Refills: 0 | Status: SHIPPED | OUTPATIENT
Start: 2025-01-22 | End: 2025-03-23

## 2025-01-21 RX ORDER — CLOPIDOGREL BISULFATE 75 MG/1
75 TABLET ORAL DAILY
Qty: 21 TABLET | Refills: 0 | Status: SHIPPED | OUTPATIENT
Start: 2025-01-22

## 2025-01-21 RX ORDER — ROSUVASTATIN CALCIUM 20 MG/1
40 TABLET, COATED ORAL NIGHTLY
Status: DISCONTINUED | OUTPATIENT
Start: 2025-01-21 | End: 2025-01-21 | Stop reason: HOSPADM

## 2025-01-21 RX ADMIN — ASPIRIN 81 MG CHEWABLE TABLET 81 MG: 81 TABLET CHEWABLE at 08:10

## 2025-01-21 RX ADMIN — Medication 10 ML: at 08:10

## 2025-01-21 RX ADMIN — OXYCODONE HYDROCHLORIDE AND ACETAMINOPHEN 250 MG: 500 TABLET ORAL at 08:10

## 2025-01-21 RX ADMIN — GADOBENATE DIMEGLUMINE 20 ML: 529 INJECTION, SOLUTION INTRAVENOUS at 05:19

## 2025-01-21 RX ADMIN — CLOPIDOGREL BISULFATE 75 MG: 75 TABLET ORAL at 08:10

## 2025-01-21 NOTE — DISCHARGE SUMMARY
[facility] Medicine Services  DISCHARGE SUMMARY    Patient Name: Jorgito Luis  : 1965  MRN: [id number]    Date of Admission: 2025  4:11 PM  Date of Discharge:  2025  Primary Care Physician: Meseret Martinez APRN    Consults       No orders found for last 30 day(s).            Hospital Course       Active Hospital Problems    Diagnosis  POA    **Left arm weakness [R29.898]  Yes    Hyperlipidemia [E78.5]  Yes    Essential hypertension [I10]  Yes    ARMAND (generalized anxiety disorder) [F41.1]  Yes    Stenosis of right carotid artery [I65.21]  Yes      Resolved Hospital Problems   No resolved problems to display.      Hospital Course:  Jorgito Luis is a 59 y.o. male with history of hypertension, hyperlipidemia, general anxiety disorder who presented with left arm weakness found to have subacute CVA, right ICA stenosis     Acute versus subacute infarct seen on MRI  Right ICA stenosis   Hyperlipidemia  -Stroke neurology followed, MRI obtained, was concerning for multifocal acute punctate infarcts possible cardioembolic, also had a enhanced ring lesion in the right corpus callosum that could be related to a demyelinating process.  Echo had negative bubble study.  CTA head showed 60 to 80% stenosis of right ICA, however carotid ultrasound showed this to be between 50 -69%.  Recommendation is to continue DAPT with aspirin and Plavix for 21 days followed by aspirin monotherapy,he had previously reported intolerance to statins, recommendation is to try rosuvastatin 40 mg and Zetia 10 mg  -General Neurology was consulted to discuss further workup for possible demyelinating/inflammatory processes, however, patient declined LP or any further testing at this time, he will follow-up with Dr. Ramos as outpatient     Hypertension  Continue home meds    Discharge Follow Up Recommendations for outpatient labs/diagnostics:  Follow-up with PCP in 1 week  Follow-up with neurology as  scheduled  Follow-up with [facility] for Holter monitor placement    Day of Discharge     HPI: No acute events overnight, patient rested well still he is back to baseline    Review of Systems  Gen- No fevers, chills  CV- No chest pain, palpitations  Resp- No cough, dyspnea  GI- No N/V/D, abd pain     Vital Signs:   Temp:  [97.4 °F (36.3 °C)-98 °F (36.7 °C)] 98 °F (36.7 °C)  Heart Rate:  [60-97] 67  Resp:  [14-18] 14  BP: (146-178)/() 146/94      Physical Exam:  Constitutional: No acute distress, awake, alert  HENT: NCAT, mucous membranes moist  Respiratory: Clear to auscultation bilaterally, respiratory effort normal   Cardiovascular: RRR, no murmurs, rubs, or gallops  Gastrointestinal: Positive bowel sounds, soft, nontender, nondistended  Musculoskeletal: No bilateral ankle edema  Psychiatric: Appropriate affect, cooperative  Neurologic: Oriented x 3, nonfocal  Skin: No rashes      Pertinent  and/or Most Recent Results     LAB RESULTS:      Lab 01/21/25  0734 01/20/25  1557 01/20/25  1554   WBC  --   --  8.00   HEMOGLOBIN  --   --  16.1   HEMOGLOBIN, POC  --  16.7  --    HEMATOCRIT  --   --  47.1   HEMATOCRIT POC  --  49  --    PLATELETS  --   --  315   NEUTROS ABS  --   --  5.74   IMMATURE GRANS (ABS)  --   --  0.03   LYMPHS ABS  --   --  1.45   MONOS ABS  --   --  0.53   EOS ABS  --   --  0.17   MCV  --   --  88.0   SED RATE 16  --   --    CRP 0.42  --   --    PROTIME  --   --  12.7   APTT  --   --  30.0         Lab 01/21/25  0734 01/20/25  1557   CREATININE  --  0.80   EGFR  --  102.6   HEMOGLOBIN A1C 5.30  --              Lab 01/20/25  1554   PROTIME 12.7   INR 0.95         Lab 01/21/25  0734   CHOLESTEROL 422*   LDL CHOL 357*   HDL CHOL 40   TRIGLYCERIDES 132             Brief Urine Lab Results       None          Microbiology Results (last 10 days)       ** No results found for the last 240 hours. **            Bilateral Carotid Duplex    Result Date: 1/21/2025    Right internal carotid artery  demonstrates a 50-69% stenosis.   Antegrade right vertebral flow.   Left internal carotid artery demonstrates a less than 50% stenosis.   Antegrade left vertebral flow.     MRI Brain With & Without Contrast    Result Date: 1/21/2025  MRI BRAIN W WO CONTRAST, MRI THORACIC SPINE W WO CONTRAST, MRI LUMBAR SPINE W WO CONTRAST Date of Exam: 1/21/2025 4:10 AM EST Indication: MS protocol  Comparison: Noncontrast brain MRI 1 day prior Technique:  Routine multiplanar/multisequence sequence images of the brain, thoracic and lumbar spine were obtained before and after the uneventful administration of 18 mL Multihance. Findings: MRI brain: The addition of postcontrast as well as MS protocol thin cut sequences redemonstrates numerous bilateral asymmetric areas of white matter T2 hyperintensity, primarily subcortical with several juxtacortical lesions also present. The ringlike finding involving the right aspect of the corpus callosum demonstrates some central volume loss, without convincing enhancement. There is however some faint enhancement associated with areas of cortical diffusion restriction noted on noncontrast exam, best appreciated on image 141 and 147 of series 8. No additional abnormal enhancement is evident. Findings are otherwise unchanged from same day noncontrast comparison. MRI thoracic spine: T1 marrow signal is preserved, without evidence of fracture or suspicious marrow replacing lesion. There is mild exaggeration of the usual upper thoracic kyphosis, otherwise without listhesis or subluxation. The thoracic spinal cord demonstrates no convincing focal areas of intramedullary signal abnormality. There is no abnormal enhancement. The paraspinal soft tissues demonstrate no acute or suspicious findings. Mild multilevel thoracic spondylosis is evident with some small osteophytes and disc space height loss. There is no significant associated spinal canal or neuroforaminal narrowing. MRI lumbar spine: Marrow signal  is preserved, without evidence of fracture or suspicious marrow replacing lesion. Alignment remains anatomic, without evidence of listhesis or subluxation. The conus medullaris and cauda equina nerve roots appear satisfactory. The paraspinal soft tissues demonstrate no acute or suspicious findings. There is no abnormal enhancement. Mild lumbar spondylosis is present with some very small areas of disc bulge and small osteophytes. The spinal canal and neural foramina remain widely patent.     Impression: The addition of postcontrast sequences demonstrates no enhancement associated with the diffusion restricting ringlike area of T2 hyperintensity seen within the corpus callosum on noncontrast exam. The appearance of this lesion remains most concerning for  an area of potential subacute demyelination. Additional tiny areas of diffusion restriction seen along cortical margins of the right pre and postcentral gyrus demonstrate corresponding enhancement, favoring areas of acute infarct. While neoplasm is considered significantly less likely, follow-up contrast-enhanced MRI could be useful to ensure expected evolution of presumed small areas of infarct. No evidence of demyelinating process or other focal signal abnormality of the thoracic spinal cord, conus medullaris and cauda equina nerve roots. Minimal thoracic and lumbar spondylosis is present, without associated areas of significant spinal canal or  neuroforaminal narrowing. Electronically Signed: Alfred Choudhury MD  1/21/2025 5:47 AM EST  Workstation ID: [id number]    MRI Lumbar Spine With & Without Contrast    Result Date: 1/21/2025  MRI BRAIN W WO CONTRAST, MRI THORACIC SPINE W WO CONTRAST, MRI LUMBAR SPINE W WO CONTRAST Date of Exam: 1/21/2025 4:10 AM EST Indication: MS protocol  Comparison: Noncontrast brain MRI 1 day prior Technique:  Routine multiplanar/multisequence sequence images of the brain, thoracic and lumbar spine were obtained before and after the  uneventful administration of 18 mL Multihance. Findings: MRI brain: The addition of postcontrast as well as MS protocol thin cut sequences redemonstrates numerous bilateral asymmetric areas of white matter T2 hyperintensity, primarily subcortical with several juxtacortical lesions also present. The ringlike finding involving the right aspect of the corpus callosum demonstrates some central volume loss, without convincing enhancement. There is however some faint enhancement associated with areas of cortical diffusion restriction noted on noncontrast exam, best appreciated on image 141 and 147 of series 8. No additional abnormal enhancement is evident. Findings are otherwise unchanged from same day noncontrast comparison. MRI thoracic spine: T1 marrow signal is preserved, without evidence of fracture or suspicious marrow replacing lesion. There is mild exaggeration of the usual upper thoracic kyphosis, otherwise without listhesis or subluxation. The thoracic spinal cord demonstrates no convincing focal areas of intramedullary signal abnormality. There is no abnormal enhancement. The paraspinal soft tissues demonstrate no acute or suspicious findings. Mild multilevel thoracic spondylosis is evident with some small osteophytes and disc space height loss. There is no significant associated spinal canal or neuroforaminal narrowing. MRI lumbar spine: Marrow signal is preserved, without evidence of fracture or suspicious marrow replacing lesion. Alignment remains anatomic, without evidence of listhesis or subluxation. The conus medullaris and cauda equina nerve roots appear satisfactory. The paraspinal soft tissues demonstrate no acute or suspicious findings. There is no abnormal enhancement. Mild lumbar spondylosis is present with some very small areas of disc bulge and small osteophytes. The spinal canal and neural foramina remain widely patent.     Impression: The addition of postcontrast sequences demonstrates no  enhancement associated with the diffusion restricting ringlike area of T2 hyperintensity seen within the corpus callosum on noncontrast exam. The appearance of this lesion remains most concerning for  an area of potential subacute demyelination. Additional tiny areas of diffusion restriction seen along cortical margins of the right pre and postcentral gyrus demonstrate corresponding enhancement, favoring areas of acute infarct. While neoplasm is considered significantly less likely, follow-up contrast-enhanced MRI could be useful to ensure expected evolution of presumed small areas of infarct. No evidence of demyelinating process or other focal signal abnormality of the thoracic spinal cord, conus medullaris and cauda equina nerve roots. Minimal thoracic and lumbar spondylosis is present, without associated areas of significant spinal canal or  neuroforaminal narrowing. Electronically Signed: Alfred Choudhury MD  1/21/2025 5:47 AM EST  Workstation ID: [id number]    MRI Thoracic Spine With & Without Contrast    Result Date: 1/21/2025  MRI BRAIN W WO CONTRAST, MRI THORACIC SPINE W WO CONTRAST, MRI LUMBAR SPINE W WO CONTRAST Date of Exam: 1/21/2025 4:10 AM EST Indication: MS protocol  Comparison: Noncontrast brain MRI 1 day prior Technique:  Routine multiplanar/multisequence sequence images of the brain, thoracic and lumbar spine were obtained before and after the uneventful administration of 18 mL Multihance. Findings: MRI brain: The addition of postcontrast as well as MS protocol thin cut sequences redemonstrates numerous bilateral asymmetric areas of white matter T2 hyperintensity, primarily subcortical with several juxtacortical lesions also present. The ringlike finding involving the right aspect of the corpus callosum demonstrates some central volume loss, without convincing enhancement. There is however some faint enhancement associated with areas of cortical diffusion restriction noted on noncontrast exam, best  appreciated on image 141 and 147 of series 8. No additional abnormal enhancement is evident. Findings are otherwise unchanged from same day noncontrast comparison. MRI thoracic spine: T1 marrow signal is preserved, without evidence of fracture or suspicious marrow replacing lesion. There is mild exaggeration of the usual upper thoracic kyphosis, otherwise without listhesis or subluxation. The thoracic spinal cord demonstrates no convincing focal areas of intramedullary signal abnormality. There is no abnormal enhancement. The paraspinal soft tissues demonstrate no acute or suspicious findings. Mild multilevel thoracic spondylosis is evident with some small osteophytes and disc space height loss. There is no significant associated spinal canal or neuroforaminal narrowing. MRI lumbar spine: Marrow signal is preserved, without evidence of fracture or suspicious marrow replacing lesion. Alignment remains anatomic, without evidence of listhesis or subluxation. The conus medullaris and cauda equina nerve roots appear satisfactory. The paraspinal soft tissues demonstrate no acute or suspicious findings. There is no abnormal enhancement. Mild lumbar spondylosis is present with some very small areas of disc bulge and small osteophytes. The spinal canal and neural foramina remain widely patent.     Impression: The addition of postcontrast sequences demonstrates no enhancement associated with the diffusion restricting ringlike area of T2 hyperintensity seen within the corpus callosum on noncontrast exam. The appearance of this lesion remains most concerning for  an area of potential subacute demyelination. Additional tiny areas of diffusion restriction seen along cortical margins of the right pre and postcentral gyrus demonstrate corresponding enhancement, favoring areas of acute infarct. While neoplasm is considered significantly less likely, follow-up contrast-enhanced MRI could be useful to ensure expected evolution of  presumed small areas of infarct. No evidence of demyelinating process or other focal signal abnormality of the thoracic spinal cord, conus medullaris and cauda equina nerve roots. Minimal thoracic and lumbar spondylosis is present, without associated areas of significant spinal canal or  neuroforaminal narrowing. Electronically Signed: Alfred Choudhury MD  1/21/2025 5:47 AM EST  Workstation ID: [id number]    MRI Brain Without Contrast    Addendum Date: 1/20/2025    ADDENDUM #1 Tiny foci of restricted diffusion with ADC signal dropout noted involving the cortical gray matter of the left frontal lobe, the right parietal lobe, and with hazy involvement of the right frontal lobe. These are concerning for tiny infarcts as well. A voice recognition error is present in the impression of the ringlike structure. The examination was performed without contrast and therefore it represents a ringlike area of increased T2 or FLAIR signal and not ring enhancement. Electronically Signed: Genaro Fish MD  1/20/2025 9:50 PM EST  Workstation ID: [id number] ORIGINAL REPORT: MRI BRAIN WO CONTRAST Date of Exam: 1/20/2025 4:50 PM EST Indication: left sided weakness.  Comparison: None available. Technique:  Routine multiplanar/multisequence sequence images of the brain were obtained without contrast administration. Findings: Ringlike areas of questionable restricted diffusion versus T2 shine through and ADC signal dropout involving the right corpus callosum seen on FLAIR image #17 series 6. No associated blood products. Multifocal areas of T2/FLAIR signal increase are noted within the subcortical, deep cerebral, and periventricular white matter consistent with chronic small vessel/microangiopathic ischemic changes. The ventricles and sulci are normal in size and configuration. . No extra-axial mass or collection. The posterior fossa is normal. Sellar and suprasellar structures are normal. The major intracranial flow-voids of the [place]  are patent. Orbital and periorbital soft tissues are normal. The visualized paranasal sinuses and ethmoid air cells are aerated. The mastoid air cells are aerated.. 2.8 cm x 0.7 cm fatty lesion arising from the outer table of the skull consistent with a scalp lipoma. Impression: Ring-enhancing area of possible restricted diffusion versus T2 shine through on ADC signal changes involving the right corpus callosum. This may represent a small infarct. Consider an MRI of the brain with contrast to exclude demyelinating disease or much less likely an underlying mass. Electronically Signed: Genaro Fish MD  1/20/2025 5:34 PM EST  Workstation ID: [id number]    Result Date: 1/20/2025  MRI BRAIN WO CONTRAST Date of Exam: 1/20/2025 4:50 PM EST Indication: left sided weakness.  Comparison: None available. Technique:  Routine multiplanar/multisequence sequence images of the brain were obtained without contrast administration. Findings: Ringlike areas of questionable restricted diffusion versus T2 shine through and ADC signal dropout involving the right corpus callosum seen on FLAIR image #17 series 6. No associated blood products. Multifocal areas of T2/FLAIR signal increase are noted within the subcortical, deep cerebral, and periventricular white matter consistent with chronic small vessel/microangiopathic ischemic changes. The ventricles and sulci are normal in size and configuration. . No extra-axial mass or collection. The posterior fossa is normal. Sellar and suprasellar structures are normal. The major intracranial flow-voids of the [place] are patent. Orbital and periorbital soft tissues are normal. The visualized paranasal sinuses and ethmoid air cells are aerated. The mastoid air cells are aerated.. 2.8 cm x 0.7 cm fatty lesion arising from the outer table of the skull consistent with a scalp lipoma.     Impression: Ring-enhancing area of possible restricted diffusion versus T2 shine through on ADC signal  changes involving the right corpus callosum. This may represent a small infarct. Consider an MRI of the brain with contrast to exclude demyelinating disease or much less likely an underlying mass. Electronically Signed: Genaro Fish MD  1/20/2025 5:34 PM EST  Workstation ID: [id number]    CT CEREBRAL PERFUSION WITH & WITHOUT CONTRAST    Result Date: 1/20/2025  CT CEREBRAL PERFUSION W WO CONTRAST, CT ANGIOGRAM NECK, CT ANGIOGRAM HEAD Date of Exam: 1/20/2025 6:00 PM EST Indication: Left arm and leg weakness.  Comparison: None available. Technique: Axial CT images of the brain were obtained prior to and after the administration of 115 cc Isovue-370 IV contrast . Core blood volume, core blood flow, mean transit time, and Tmax images were obtained utilizing the Rapid software protocol. A limited CT angiogram of the head was also performed to measure the blood vessel density. The radiation dose reduction device was turned on for each scan per the ALARA (As Low as Reasonably Achievable) protocol. CTA of the head and neck was performed after the uneventful intravenous administration of iodinated contrast.  Reconstructed coronal and sagittal images were also obtained. In addition, a 3-D volume rendered image was created for interpretation. Automated exposure control and iterative reconstruction methods were used.  FINDINGS: CT perfusion there is no elevated Tmax or decreased cerebral blood flow or blood volume to indicate an infarct. Mild atheromatous disease of the aortic arch the right common carotid artery is normal. Calcified and noncalcified mural thrombus involving the proximal right internal carotid artery with a residual vessel luminal diameter of 1-to 2 mm with a native vessel luminal diameter of right 0.5 cm consistent with between 60 and 80% stenosis per NASCET criteria. Moderate atheromatous disease involving the intracranial segments of the right internal carotid artery. The right carotid terminus is normal. The  visualized branches of the right anterior and middle cerebral arteries are normal. The left common carotid artery is normal. Mild atheromatous disease of the left carotid bulb. Noncalcified atheromatous narrowing of the proximal left internal carotid artery with a residual vessel luminal diameter of 3 mm and a native vessel luminal diameter of 5 mm consistent with proximally 40% stenosis per NASCET criteria. Otherwise the extracranial left internal carotid artery is normal. Moderate atheromatous disease involving intracranial segments of the left internal carotid artery. The left carotid terminus is normal. The left anterior cerebral artery arises from the right anterior circulation via a prominent right posterior communicating artery. The visualized branches of the left middle cerebral artery are normal. Both vertebral arteries arise from the subclavian arteries. Both vertebral arteries supply the basilar artery. The basilar artery and basilar artery tip are normal. The basilar artery terminates in bilateral posterior cerebral arteries which appear normal. The intracranial venous sinuses are patent. Orbital and peripheral soft tissues are normal. Mucosal thickening of the maxillary sinuses. Secretions within the left maxillary sinus. The mastoid air cells are aerated. The parotid and submandibular glands are normal. The airway is clear. The thyroid gland is normal. The lung apices are clear.     There is between 60 and 80% narrowing of the proximal right internal carotid artery. No vessel occlusion is seen. No evidence of infarct on CT perfusion. Electronically Signed: Genaro Fish MD  1/20/2025 6:34 PM EST  Workstation ID: [id number]    CT Angiogram Head    Result Date: 1/20/2025  CT CEREBRAL PERFUSION W WO CONTRAST, CT ANGIOGRAM NECK, CT ANGIOGRAM HEAD Date of Exam: 1/20/2025 6:00 PM EST Indication: Left arm and leg weakness.  Comparison: None available. Technique: Axial CT images of the brain were obtained prior  to and after the administration of 115 cc Isovue-370 IV contrast . Core blood volume, core blood flow, mean transit time, and Tmax images were obtained utilizing the Rapid software protocol. A limited CT angiogram of the head was also performed to measure the blood vessel density. The radiation dose reduction device was turned on for each scan per the ALARA (As Low as Reasonably Achievable) protocol. CTA of the head and neck was performed after the uneventful intravenous administration of iodinated contrast.  Reconstructed coronal and sagittal images were also obtained. In addition, a 3-D volume rendered image was created for interpretation. Automated exposure control and iterative reconstruction methods were used.  FINDINGS: CT perfusion there is no elevated Tmax or decreased cerebral blood flow or blood volume to indicate an infarct. Mild atheromatous disease of the aortic arch the right common carotid artery is normal. Calcified and noncalcified mural thrombus involving the proximal right internal carotid artery with a residual vessel luminal diameter of 1-to 2 mm with a native vessel luminal diameter of right 0.5 cm consistent with between 60 and 80% stenosis per NASCET criteria. Moderate atheromatous disease involving the intracranial segments of the right internal carotid artery. The right carotid terminus is normal. The visualized branches of the right anterior and middle cerebral arteries are normal. The left common carotid artery is normal. Mild atheromatous disease of the left carotid bulb. Noncalcified atheromatous narrowing of the proximal left internal carotid artery with a residual vessel luminal diameter of 3 mm and a native vessel luminal diameter of 5 mm consistent with proximally 40% stenosis per NASCET criteria. Otherwise the extracranial left internal carotid artery is normal. Moderate atheromatous disease involving intracranial segments of the left internal carotid artery. The left carotid  terminus is normal. The left anterior cerebral artery arises from the right anterior circulation via a prominent right posterior communicating artery. The visualized branches of the left middle cerebral artery are normal. Both vertebral arteries arise from the subclavian arteries. Both vertebral arteries supply the basilar artery. The basilar artery and basilar artery tip are normal. The basilar artery terminates in bilateral posterior cerebral arteries which appear normal. The intracranial venous sinuses are patent. Orbital and peripheral soft tissues are normal. Mucosal thickening of the maxillary sinuses. Secretions within the left maxillary sinus. The mastoid air cells are aerated. The parotid and submandibular glands are normal. The airway is clear. The thyroid gland is normal. The lung apices are clear.     There is between 60 and 80% narrowing of the proximal right internal carotid artery. No vessel occlusion is seen. No evidence of infarct on CT perfusion. Electronically Signed: Genaro Fish MD  1/20/2025 6:34 PM EST  Workstation ID: [id number]    CT Angiogram Neck    Result Date: 1/20/2025  CT CEREBRAL PERFUSION W WO CONTRAST, CT ANGIOGRAM NECK, CT ANGIOGRAM HEAD Date of Exam: 1/20/2025 6:00 PM EST Indication: Left arm and leg weakness.  Comparison: None available. Technique: Axial CT images of the brain were obtained prior to and after the administration of 115 cc Isovue-370 IV contrast . Core blood volume, core blood flow, mean transit time, and Tmax images were obtained utilizing the Rapid software protocol. A limited CT angiogram of the head was also performed to measure the blood vessel density. The radiation dose reduction device was turned on for each scan per the ALARA (As Low as Reasonably Achievable) protocol. CTA of the head and neck was performed after the uneventful intravenous administration of iodinated contrast.  Reconstructed coronal and sagittal images were also obtained. In addition, a 3-D  volume rendered image was created for interpretation. Automated exposure control and iterative reconstruction methods were used.  FINDINGS: CT perfusion there is no elevated Tmax or decreased cerebral blood flow or blood volume to indicate an infarct. Mild atheromatous disease of the aortic arch the right common carotid artery is normal. Calcified and noncalcified mural thrombus involving the proximal right internal carotid artery with a residual vessel luminal diameter of 1-to 2 mm with a native vessel luminal diameter of right 0.5 cm consistent with between 60 and 80% stenosis per NASCET criteria. Moderate atheromatous disease involving the intracranial segments of the right internal carotid artery. The right carotid terminus is normal. The visualized branches of the right anterior and middle cerebral arteries are normal. The left common carotid artery is normal. Mild atheromatous disease of the left carotid bulb. Noncalcified atheromatous narrowing of the proximal left internal carotid artery with a residual vessel luminal diameter of 3 mm and a native vessel luminal diameter of 5 mm consistent with proximally 40% stenosis per NASCET criteria. Otherwise the extracranial left internal carotid artery is normal. Moderate atheromatous disease involving intracranial segments of the left internal carotid artery. The left carotid terminus is normal. The left anterior cerebral artery arises from the right anterior circulation via a prominent right posterior communicating artery. The visualized branches of the left middle cerebral artery are normal. Both vertebral arteries arise from the subclavian arteries. Both vertebral arteries supply the basilar artery. The basilar artery and basilar artery tip are normal. The basilar artery terminates in bilateral posterior cerebral arteries which appear normal. The intracranial venous sinuses are patent. Orbital and peripheral soft tissues are normal. Mucosal thickening of the  maxillary sinuses. Secretions within the left maxillary sinus. The mastoid air cells are aerated. The parotid and submandibular glands are normal. The airway is clear. The thyroid gland is normal. The lung apices are clear.     There is between 60 and 80% narrowing of the proximal right internal carotid artery. No vessel occlusion is seen. No evidence of infarct on CT perfusion. Electronically Signed: Genaro Fish MD  1/20/2025 6:34 PM EST  Workstation ID: [id number]    CT Head Without Contrast    Result Date: 1/20/2025  CT HEAD WO CONTRAST Date of Exam: 1/20/2025 6:00 PM EST Indication: left arm and leg weakness. Comparison: None available. Technique: Axial CT images were obtained of the head without contrast administration.  Automated exposure control and iterative construction methods were used. Findings: There is no evidence of acute infarct or hemorrhage. No abnormal extra-axial fluid collections are identified. There is no mass effect or hydrocephalus. Globes and orbits are within normal limits. There is a polyp or mucous retention cyst within the left maxillary sinus.     Impression: 1. No acute intracranial abnormality. Findings called to [facility] with stroke team at 6:28 p.m. on 1/20/2025 Electronically Signed: Morro Brantley MD  1/20/2025 6:28 PM EST  Workstation ID: [id number]    MRI Cervical Spine Without Contrast    Result Date: 1/20/2025  MRI CERVICAL SPINE WO CONTRAST Date of Exam: 1/20/2025 4:50 PM EST Indication: left arm weakness, paresthesia.  Comparison: None available. Technique:  Routine multiplanar/multisequence sequence images of the cervical spine were obtained without contrast administration.  Findings: There is normal height, alignment, and signal intensity of the cervical vertebral bodies. There is normal signal within the substance of the spinal cord. The craniovertebral junction appears normal. There is disc desiccation and disc base narrowing throughout the cervical spine. Axial images  demonstrate: C2/3: No significant disc bulge. Facet joints are within normal limits. No spinal canal stenosis or neural foraminal narrowing C3/4: No significant disc bulge. Facet joints are within normal limits. No spinal canal stenosis or neural foraminal narrowing C4/5: No significant disc bulge. There is bilateral uncovertebral joint spurring degenerative facet change resulting in mild bilateral neural foraminal narrowing right greater than left. C5/6: Minimal posterior disc bulge. There is mild uncovertebral joint spurring and degenerative facet change resulting in mild bilateral neural foraminal narrowing. C6/7: No significant disc bulge. There are mild degenerative facet changes bilaterally. No spinal canal stenosis or neural foraminal narrowing. C7/T1: Small right paracentral disc bulge. No significant canal stenosis. No neural foraminal narrowing. Facet joints are within normal limits.     Impression: 1. Minimal degenerative disc disease and degenerative facet change resulting in mild multilevel neural foraminal narrowing. No spinal canal stenosis. 2. Normal signal seen throughout the substance of the visualized spinal cord. Electronically Signed: Morro Brantley MD  1/20/2025 6:04 PM EST  Workstation ID: [id number]    XR Chest 1 View    Result Date: 1/20/2025  XR CHEST 1 VW Date of Exam: 1/20/2025 4:04 PM EST Indication: Acute Stroke Protocol (onset < 12 hrs) Comparison: 9/11/2023 Findings: The heart, mediastinum and pulmonary vasculature appear within normal limits. The lungs appear well expanded and clear except for thin linear scarring in the area of the lingula, which is stable. No edema, effusion or pneumothorax is seen. Bony structures appear to be intact.     Impression: No evidence of active chest disease. Electronically Signed: Desmond Edmondson MD  1/20/2025 4:26 PM EST  Workstation ID: [id number]     Results for orders placed during the hospital encounter of 01/20/25    Bilateral Carotid  Duplex    Interpretation Summary    Right internal carotid artery demonstrates a 50-69% stenosis.    Antegrade right vertebral flow.    Left internal carotid artery demonstrates a less than 50% stenosis.    Antegrade left vertebral flow.      Results for orders placed during the hospital encounter of 01/20/25    Bilateral Carotid Duplex    Interpretation Summary    Right internal carotid artery demonstrates a 50-69% stenosis.    Antegrade right vertebral flow.    Left internal carotid artery demonstrates a less than 50% stenosis.    Antegrade left vertebral flow.      Results for orders placed during the hospital encounter of 01/20/25    Adult Transthoracic Echo Complete W/ Cont if Necessary Per Protocol (With Agitated Saline)    Interpretation Summary    Left ventricular ejection fraction appears to be 61 - 65%.    Left ventricular diastolic function is consistent with (grade I) impaired relaxation.    Saline test results are negative.      Plan for Follow-up of Pending Labs/Results:     Discharge Details        Discharge Medications        New Medications        Instructions Start Date   aspirin 81 MG chewable tablet   81 mg, Oral, Daily   Start Date: January 22, 2025     clopidogrel 75 MG tablet  Commonly known as: PLAVIX   75 mg, Oral, Daily   Start Date: January 22, 2025     ezetimibe 10 MG tablet  Commonly known as: Zetia   10 mg, Oral, Daily      rosuvastatin 40 MG tablet  Commonly known as: CRESTOR   40 mg, Oral, Nightly             Continue These Medications        Instructions Start Date   acetaminophen 160 MG/5ML liquid  Commonly known as: TYLENOL   Oral, As Needed      albuterol (2.5 MG/3ML) 0.083% nebulizer solution  Commonly known as: PROVENTIL   USE 3 ML VIA NEBULIZER THREE TIMES DAILY AS NEEDED      ascorbic acid 250 MG tablet  Commonly known as: VITAMIN C   250 mg, Daily      busPIRone 5 MG tablet  Commonly known as: BUSPAR   5 mg      CBD oral oil  Commonly known as: cannabidiol   As Needed       cholecalciferol 25 MCG (1000 UT) tablet  Commonly known as: VITAMIN D3   1,000 Units, Daily      Hydrocortisone Max St 1 % cream  Generic drug: hydrocortisone   Topical, As Needed      hydrOXYzine 10 MG tablet  Commonly known as: ATARAX   10-20 mg, 3 Times Daily PRN      ibuprofen 100 MG/5ML suspension  Commonly known as: ADVIL,MOTRIN   Every 6 Hours PRN      ivermectin 3 MG tablet tablet  Commonly known as: STROMECTOL   As Needed.      L-THEANINE PO   Daily      Lidoderm 5 %  Generic drug: lidocaine   1 patch, Transdermal, As Needed      lisinopril 10 MG tablet  Commonly known as: PRINIVIL,ZESTRIL   10 mg, Oral, As Needed      losartan 25 MG tablet  Commonly known as: COZAAR   25 mg, Oral, As Needed      Magnesium 400 MG tablet   400 mg, Daily      MELATONIN CR PO   Nightly PRN      polyethylene glycol 17 GM/SCOOP powder  Commonly known as: MIRALAX   17 g, Oral, Daily PRN, Follow directions given at office      psyllium 58.6 % powder  Commonly known as: METAMUCIL   Oral, 2 Times Daily      sucralfate 1 g tablet  Commonly known as: CARAFATE   1 g, Oral, Daily PRN      Turmeric 500 MG capsule   1 capsule, Daily               Allergies   Allergen Reactions    Vancomycin Itching     Has to be given benadryl before he can take vancomycin    Lansoprazole Other (See Comments) and Rash     Mouth blisters    Statins Myalgia     Hurt his legs when he took them         Discharge Disposition: Home  Home or Self Care    Diet:  Hospital:  Diet Order   Procedures    Diet: Cardiac; Healthy Heart (2-3 Na+); Fluid Consistency: Thin (IDDSI 0)       Activity: As tolerated      Restrictions or Other Recommendations:         CODE STATUS:    There are no questions and answers to display.       Future Appointments   Date Time Provider Department Center   2/3/2025  3:00 PM Trisha Capps PA-C [facility]       Additional Instructions for the Follow-ups that You Need to Schedule       Ambulatory Referral to [facility]  [place] [facility] [place]   As directed      Patient DC'd after office hours.  Please schedule ASAP for 30-day cardiac monitor.    Order Comments: Patient DC'd after office hours.  Please schedule ASAP for 30-day cardiac monitor.    Service Requested: [facility] Stroke [facility]        Ambulatory Referral to Neurology   As directed      Possible demyelinating process    Order Comments: Possible demyelinating process                 Tobi Fitzgerald MD  01/21/25      Time Spent on Discharge:  I spent  35  minutes on this discharge activity which included: face-to-face encounter with the patient, reviewing the data in the system, coordination of the care with the nursing staff as well as consultants, documentation, and entering orders.

## 2025-01-21 NOTE — PLAN OF CARE
Goal Outcome Evaluation:  Plan of Care Reviewed With: patient, spouse           Outcome Evaluation: OT eval complete. Pt presents at baseline with ADL performance and functional mobility. OT skills not warranted. OT signing off. Recommend home with assist at D/C.    Anticipated Discharge Disposition (OT): home with assist

## 2025-01-21 NOTE — THERAPY DISCHARGE NOTE
"Patient Name: Jorgito Luis  : 1965    MRN: [id number]                              Today's Date: 2025       Admit Date: 2025    Visit Dx:     ICD-10-CM ICD-9-CM   1. Acute left-sided weakness  R53.1 728.87   2. Paresthesias  R20.2 782.0   3. Acute ischemic stroke  I63.9 434.91   4. Stenosis of right carotid artery  I65.21 433.10     Patient Active Problem List   Diagnosis    CA of rectum    Esophageal obstruction due to food impaction    Ulcer of esophagus without bleeding    Esophageal dysphagia    Left arm weakness    Hyperlipidemia    Status post amputation of left great toe    Essential hypertension    ARMAND (generalized anxiety disorder)    Stenosis of right carotid artery     Past Medical History:   Diagnosis Date    Arthritis     Cancer     colon-rectum    Difficulty in walking     GERD (gastroesophageal reflux disease)     history of    Hearing loss     Hypertension     states he takes his meds \"as needed\" based on blood pressures - 24    Memory loss     Sleep apnea     does not use CPAP     Past Surgical History:   Procedure Laterality Date    ABDOMINAL SURGERY      colon cancer removed rectum - [facility]    ENDOSCOPY N/A 2024    Procedure: ESOPHAGOGASTRODUODENOSCOPY WITH FORIEGN BODY REMOVAL;  Surgeon: Jacki Perera MD;  Location: [facility] ENDOSCOPY;  Service: Gastroenterology;  Laterality: N/A;    ILEOSTOMY CLOSURE  2015    [place]    KNEE ARTHROSCOPY Left     meniscus    LOW ANTERIOR BOWEL RESECTION  2015    with ileostomy; [place] - see care everywhere    TOE AMPUTATION Left 2024      General Information       Row Name 25 1154          Physical Therapy Time and Intention    Document Type discharge evaluation/summary  -CK     Mode of Treatment physical therapy;individual therapy  -CK       Row Name 25 1154          General Information    Patient Profile Reviewed yes  -CK     Prior Level of Function independent:;all household " mobility;ADL's  Jordyn with SPC since his L great toe amp (work injury) in 2023. Ind with ADLs, wife able to assist if needed  -CK     Existing Precautions/Restrictions no known precautions/restrictions  -CK     Barriers to Rehab none identified  -CK       Row Name 01/21/25 1154          Living Environment    People in Home spouse;child(braeden), dependent  -CK       Row Name 01/21/25 1154          Home Main Entrance    Number of Stairs, Main Entrance one  -CK     Stair Railings, Main Entrance none  -CK       Row Name 01/21/25 1154          Stairs Within Home, Primary    Stairs, Within Home, Primary 3 story home, able to stay on main floor  -CK     Stair Railings, Within Home, Primary railings on both sides of stairs  -CK       Row Name 01/21/25 1154          Cognition    Orientation Status (Cognition) oriented x 4  -CK       Row Name 01/21/25 1154          Safety Issues/Impairments Affecting Functional Mobility    Safety Issues Affecting Function (Mobility) safety precaution awareness  -CK     Impairments Affecting Function (Mobility) balance;endurance/activity tolerance  -CK               User Key  (r) = Recorded By, (t) = Taken By, (c) = Cosigned By      Initials Name Provider Type    CK Arielle Ramirez, PT Physical Therapist                   Mobility       Row Name 01/21/25 1159          Bed Mobility    Bed Mobility supine-sit  -CK     Supine-Sit [place] (Bed Mobility) independent  -CK       Row Name 01/21/25 1159          Sit-Stand Transfer    Sit-Stand [place] (Transfers) standby assist  -CK     Assistive Device (Sit-Stand Transfers) cane, straight  -CK       Row Name 01/21/25 1159          Gait/Stairs (Locomotion)    [place] Level (Gait) contact guard  -CK     Assistive Device (Gait) cane, straight  -CK     Patient was able to Ambulate yes  -CK     Distance in Feet (Gait) 300  -CK     Deviations/Abnormal Patterns (Gait) gait speed decreased  -CK     Comment, (Gait/Stairs) Patient ambulated in  saleh with step through gait pattern, good sequencing with his personal straight cane. Able to perform head turns/looking over shoulder and had no LOB. Reports his ambulation feels to be at his baseline.  -CK               User Key  (r) = Recorded By, (t) = Taken By, (c) = Cosigned By      Initials Name Provider Type    CK Arielle Ramirez PT Physical Therapist                   Obj/Interventions       Row Name 01/21/25 1303          Range of Motion Comprehensive    General Range of Motion bilateral lower extremity ROM WFL  -CK       Row Name 01/21/25 1303          Strength Comprehensive (MMT)    General Manual Muscle Testing (MMT) Assessment no strength deficits identified  -CK     Comment, General Manual Muscle Testing (MMT) Assessment BLE symmetrical with 5/5 strength  -CK       Row Name 01/21/25 1303          Motor Skills    Motor Skills coordination  -CK     Coordination bilateral;lower extremity;heel to shin;WNL  -CK       Row Name 01/21/25 1303          Balance    Balance Assessment sitting static balance;sitting dynamic balance;standing static balance;standing dynamic balance  -CK     Static Sitting Balance independent  -CK     Dynamic Sitting Balance independent  -CK     Position, Sitting Balance unsupported;sitting edge of bed;sitting in chair  -CK     Static Standing Balance standby assist  -CK     Dynamic Standing Balance standby assist  -CK     Position/Device Used, Standing Balance supported;cane, straight  -CK     Comment, Balance no LOB  -CK       Row Name 01/21/25 1303          Sensory Assessment (Somatosensory)    Sensory Assessment (Somatosensory) LE sensation intact  -CK               User Key  (r) = Recorded By, (t) = Taken By, (c) = Cosigned By      Initials Name Provider Type    CK Arielle Ramirez PT Physical Therapist                   Goals/Plan    No documentation.                  Clinical Impression       Row Name 01/21/25 1304          Pain    Pretreatment Pain Rating 0/10 - no  pain  -CK     Posttreatment Pain Rating 0/10 - no pain  -CK       Row Name 01/21/25 1304          Plan of Care Review    Plan of Care Reviewed With patient;spouse  -CK     Outcome Evaluation Patient presents with mild balance and endurance deficits that are present at his baseline. He was able to ambulate 300' CGA with his SPC with no LOB and reports resolution of his admitting symptoms. No IPPT needs identified due to baseline mobility. Will sign off and recommend D/C home when medically appropriate.  -CK       Row Name 01/21/25 1304          Therapy Assessment/Plan (PT)    Patient/Family Therapy Goals Statement (PT) none state  -CK     Criteria for Skilled Interventions Met (PT) no;no problems identified which require skilled intervention;does not meet criteria for skilled intervention  -CK     Therapy Frequency (PT) evaluation only  -CK       Row Name 01/21/25 1304          Vital Signs    Pre Systolic BP Rehab 157  -CK     Pre Treatment Diastolic   -CK     Post Systolic BP Rehab 178  -CK     Post Treatment Diastolic   -CK     Pretreatment Heart Rate (beats/min) 70  -CK     Posttreatment Heart Rate (beats/min) 71  -CK     Pre SpO2 (%) 94  -CK     O2 Delivery Pre Treatment room air  -CK     O2 Delivery Intra Treatment room air  -CK     Post SpO2 (%) 97  -CK     O2 Delivery Post Treatment room air  -CK     Pre Patient Position Supine  -CK     Post Patient Position Sitting  -CK       Row Name 01/21/25 1304          Positioning and Restraints    Pre-Treatment Position in bed  -CK     Post Treatment Position chair  -CK     In Chair sitting;call light within reach;encouraged to call for assist;exit alarm on;with family/caregiver;with other staff;notified nsg  -CK               User Key  (r) = Recorded By, (t) = Taken By, (c) = Cosigned By      Initials Name Provider Type    CK Arielle Ramirez, PT Physical Therapist                   Outcome Measures       Row Name 01/21/25 1306 01/21/25 0800       How  much help from another person do you currently need...    Turning from your back to your side while in flat bed without using bedrails? 4  -CK 4  -LH    Moving from lying on back to sitting on the side of a flat bed without bedrails? 4  -CK 4  -LH    Moving to and from a bed to a chair (including a wheelchair)? 4  -CK 4  -LH    Standing up from a chair using your arms (e.g., wheelchair, bedside chair)? 4  -CK 4  -LH    Climbing 3-5 steps with a railing? 3  -CK 3  -LH    To walk in hospital room? 3  -CK 3  -LH    AM-PAC 6 Clicks Score (PT) 22  -CK 22  -LH    Highest Level of Mobility Goal 7 --> Walk 25 feet or more  -CK 7 --> Walk 25 feet or more  -LH      Row Name 01/21/25 1306          Modified [place] Scale    Pre-Stroke Modified [place] Scale 6 - Unable to determine (UTD) from the medical record documentation  -CK     Modified [place] Scale 0 - No Symptoms at all.  -CK       Row Name 01/21/25 1306          Functional Assessment    Outcome Measure Options AM-PAC 6 Clicks Basic Mobility (PT);Modified [place]  -CK               User Key  (r) = Recorded By, (t) = Taken By, (c) = Cosigned By      Initials Name Provider Type    Arielle Black, PT Physical Therapist     Shelia Kendall, RN Registered Nurse                  Physical Therapy Education       Title: PT OT SLP Therapies (Done)       Topic: Physical Therapy (Done)       Point: Mobility training (Done)       Learning Progress Summary            Patient Acceptance, E, VU by CK at 1/21/2025 1307   Significant Other Acceptance, E, VU by CK at 1/21/2025 1307                      Point: Home exercise program (Done)       Learning Progress Summary            Patient Acceptance, E, VU by CK at 1/21/2025 1307   Significant Other Acceptance, E, VU by CK at 1/21/2025 1307                      Point: Body mechanics (Done)       Learning Progress Summary            Patient Acceptance, E, VU by CK at 1/21/2025 1307   Significant Other Acceptance, E, VU by CK at  1/21/2025 1307                      Point: Precautions (Done)       Learning Progress Summary            Patient Acceptance, E, VU by CK at 1/21/2025 1307   Significant Other Acceptance, E, VU by CK at 1/21/2025 1307                                      User Key       Initials Effective Dates Name Provider Type [facility] 02/06/24 -  Arielle Ramirez PT Physical Therapist PT                  PT Recommendation and Plan     Outcome Evaluation: Patient presents with mild balance and endurance deficits that are present at his baseline. He was able to ambulate 300' CGA with his SPC with no LOB and reports resolution of his admitting symptoms. No IPPT needs identified due to baseline mobility. Will sign off and recommend D/C home when medically appropriate.     Time Calculation:   PT Evaluation Complexity  History, PT Evaluation Complexity: 3 or more personal factors and/or comorbidities  Examination of Body Systems (PT Eval Complexity): total of 3 or more elements  Clinical Presentation (PT Evaluation Complexity): stable  Clinical Decision Making (PT Evaluation Complexity): low complexity  Overall Complexity (PT Evaluation Complexity): low complexity     PT Charges       Row Name 01/21/25 1307             Time Calculation    Start Time 1127  -CK      PT Received On 01/21/25  -CK         Untimed Charges    PT Eval/Re-eval Minutes 46  -CK         Total Minutes    Untimed Charges Total Minutes 46  -CK       Total Minutes 46  -CK                User Key  (r) = Recorded By, (t) = Taken By, (c) = Cosigned By      Initials Name Provider Type    CK Arielle Ramirez PT Physical Therapist                  Therapy Charges for Today       Code Description Service Date Service Provider Modifiers Qty    [id number] HC PT EVAL LOW COMPLEXITY 4 1/21/2025 Arielle Ramirez, PT GP 1            PT G-Codes  Outcome Measure Options: AM-PAC 6 Clicks Basic Mobility (PT), Modified [place]  AM-PAC 6 Clicks Score (PT): 22  Modified  [place]ure Scale: 0 - No Symptoms at all.    PT Discharge Summary  Anticipated Discharge Disposition (PT): home    Arielle Ramirez, PT  1/21/2025

## 2025-01-21 NOTE — THERAPY DISCHARGE NOTE
"Patient Name: Jorgito Luis  : 1965    MRN: [id number]                              Today's Date: 2025       Admit Date: 2025    Visit Dx:     ICD-10-CM ICD-9-CM   1. Acute left-sided weakness  R53.1 728.87   2. Paresthesias  R20.2 782.0   3. Acute ischemic stroke  I63.9 434.91   4. Stenosis of right carotid artery  I65.21 433.10     Patient Active Problem List   Diagnosis    CA of rectum    Esophageal obstruction due to food impaction    Ulcer of esophagus without bleeding    Esophageal dysphagia    Left arm weakness    Hyperlipidemia    Status post amputation of left great toe    Essential hypertension    ARMAND (generalized anxiety disorder)    Stenosis of right carotid artery     Past Medical History:   Diagnosis Date    Arthritis     Cancer     colon-rectum    Difficulty in walking     GERD (gastroesophageal reflux disease)     history of    Hearing loss     Hypertension     states he takes his meds \"as needed\" based on blood pressures - 24    Memory loss     Sleep apnea     does not use CPAP     Past Surgical History:   Procedure Laterality Date    ABDOMINAL SURGERY      colon cancer removed rectum - [facility], [place], operated by [facility]    ENDOSCOPY N/A 2024    Procedure: ESOPHAGOGASTRODUODENOSCOPY WITH FORIEGN BODY REMOVAL;  Surgeon: Jacki Perera MD;  Location: [facility] ENDOSCOPY;  Service: Gastroenterology;  Laterality: N/A;    ILEOSTOMY CLOSURE  2015    Foley    KNEE ARTHROSCOPY Left     meniscus    LOW ANTERIOR BOWEL RESECTION  2015    with ileostomy; Foley - see care everywhere    TOE AMPUTATION Left 2024      General Information       Row Name 25 1424          OT Time and Intention    Document Type discharge evaluation/summary  -LR     Mode of Treatment occupational therapy  -LR       Row Name 25 1424          General Information    Patient Profile Reviewed yes  -LR     Prior Level of Function independent:;bed mobility;ADL's;driving;transfer;home management "  -LR     Existing Precautions/Restrictions no known precautions/restrictions  -LR     Barriers to Rehab none identified  -LR       Row Name 01/21/25 1424          Living Environment    People in Home child(braeden), dependent;spouse  -LR       Row Name 01/21/25 1424          Home Main Entrance    Number of Stairs, Main Entrance one  -LR     Stair Railings, Main Entrance none  -LR       Row Name 01/21/25 1424          Cognition    Orientation Status (Cognition) oriented x 4  -LR               User Key  (r) = Recorded By, (t) = Taken By, (c) = Cosigned By      Initials Name Provider Type    LR Phuong Wei, OT Occupational Therapist                     Mobility/ADL's       Row Name 01/21/25 1427          Bed Mobility    Bed Mobility supine-sit  -LR     Supine-Sit [place] (Bed Mobility) independent  -LR     Assistive Device (Bed Mobility) head of bed elevated  -LR       Row Name 01/21/25 1427          Transfers    Transfers sit-stand transfer;stand-sit transfer  -LR       Row Name 01/21/25 1427          Sit-Stand Transfer    Sit-Stand [place] (Transfers) independent  -LR     Assistive Device (Sit-Stand Transfers) cane, straight  -LR       Row Name 01/21/25 1427          Stand-Sit Transfer    Stand-Sit [place] (Transfers) independent  -LR     Assistive Device (Stand-Sit Transfers) cane, straight  -LR       Row Name 01/21/25 1427          Functional Mobility    Functional Mobility- Ind. Level conditional independence  -LR     Functional Mobility- Device cane, straight  -LR     Functional Mobility-Distance (Feet) --  >HH distance  -LR     Patient was able to Ambulate yes  -LR       Row Name 01/21/25 1427          Activities of Daily Living    BADL Assessment/Intervention lower body dressing  -LR       Row Name 01/21/25 1427          Lower Body Dressing Assessment/Training    [place] Level (Lower Body Dressing) doff;don;socks;independent  -LR     Position (Lower Body Dressing) long sitting  -LR                User Key  (r) = Recorded By, (t) = Taken By, (c) = Cosigned By      Initials Name Provider Type    LR Phuong Wei OT Occupational Therapist                   Obj/Interventions       Row Name 01/21/25 1431          Sensory Assessment (Somatosensory)    Sensory Assessment (Somatosensory) UE sensation intact  -LR       Row Name 01/21/25 1431          Vision Assessment/Intervention    Visual Impairment/Limitations WNL  -LR       Row Name 01/21/25 1431          Range of Motion Comprehensive    General Range of Motion no range of motion deficits identified  -LR       [facility] Name 01/21/25 1431          Strength Comprehensive (MMT)    General Manual Muscle Testing (MMT) Assessment no strength deficits identified  -LR       Row Name 01/21/25 1431          Balance    Balance Assessment sitting static balance;sitting dynamic balance;sit to stand dynamic balance;standing static balance;standing dynamic balance  -LR     Static Sitting Balance independent  -LR     Dynamic Sitting Balance independent  -LR     Position, Sitting Balance unsupported;sitting edge of bed  -LR     Sit to Stand Dynamic Balance independent  -LR     Static Standing Balance independent  -LR     Dynamic Standing Balance independent  -LR     Position/Device Used, Standing Balance supported;cane, straight  -LR     Balance Interventions sitting;standing;sit to stand;supported;static;dynamic;occupation based/functional task  -LR               User Key  (r) = Recorded By, (t) = Taken By, (c) = Cosigned By      Initials Name Provider Type    LR Phuong Wei OT Occupational Therapist                   Goals/Plan    No documentation.                  Clinical Impression       Row Name 01/21/25 1433          Pain Assessment    Pretreatment Pain Rating 0/10 - no pain  -LR     Posttreatment Pain Rating 0/10 - no pain  -LR       Row Name 01/21/25 1433          Plan of Care Review    Plan of Care Reviewed With patient;spouse  -LR     Outcome Evaluation  OT eval complete. Pt presents at baseline with ADL performance and functional mobility. OT skills not warranted. OT signing off. Recommend home with assist at D/C.  -LR       Row Name 01/21/25 1433          Therapy Plan Review/Discharge Plan (OT)    Anticipated Discharge Disposition (OT) home with assist  -LR       Row Name 01/21/25 1433          Vital Signs    Pre SpO2 (%) 95  -LR     O2 Delivery Pre Treatment room air  -LR     O2 Delivery Intra Treatment room air  -LR     Post SpO2 (%) 97  -LR     O2 Delivery Post Treatment room air  -LR     Pre Patient Position Supine  -LR     Intra Patient Position Standing  -LR     Post Patient Position Supine  wife in chair  -LR       Row Name 01/21/25 1433          Positioning and Restraints    Pre-Treatment Position in bed  -LR     Post Treatment Position bed  -LR     In Bed notified nsg;fowlers;call light within reach;encouraged to call for assist;exit alarm on;with family/caregiver  -LR               User Key  (r) = Recorded By, (t) = Taken By, (c) = Cosigned By      Initials Name Provider Type    LR Phuong Wei, OT Occupational Therapist                   Outcome Measures       Row Name 01/21/25 1437          How much help from another is currently needed...    Putting on and taking off regular lower body clothing? 4  -LR     Bathing (including washing, rinsing, and drying) 4  -LR     Toileting (which includes using toilet bed pan or urinal) 4  -LR     Putting on and taking off regular upper body clothing 4  -LR     Taking care of personal grooming (such as brushing teeth) 4  -LR     Eating meals 4  -LR     AM-PAC 6 Clicks Score (OT) 24  -LR       Row Name 01/21/25 1306 01/21/25 0800       How much help from another person do you currently need...    Turning from your back to your side while in flat bed without using bedrails? 4  -CK 4  -LH    Moving from lying on back to sitting on the side of a flat bed without bedrails? 4  -CK 4  -LH    Moving to and from a bed  to a chair (including a wheelchair)? 4  -CK 4  -LH    Standing up from a chair using your arms (e.g., wheelchair, bedside chair)? 4  -CK 4  -LH    Climbing 3-5 steps with a railing? 3  -CK 3  -LH    To walk in hospital room? 3  -CK 3  -LH    AM-PAC 6 Clicks Score (PT) 22  -CK 22  -LH    Highest Level of Mobility Goal 7 --> Walk 25 feet or more  -CK 7 --> Walk 25 feet or more  -      Row Name 01/21/25 1306          Modified [place] Scale    Pre-Stroke Modified [place] Scale 6 - Unable to determine (UTD) from the medical record documentation  -CK     Modified [place] Scale 0 - No Symptoms at all.  -CK       Row Name 01/21/25 1437 01/21/25 1306       Functional Assessment    Outcome Measure Options AM-PAC 6 Clicks Daily Activity (OT)  -LR AM-PAC 6 Clicks Basic Mobility (PT);Modified [place]  -CK              User Key  (r) = Recorded By, (t) = Taken By, (c) = Cosigned By      Initials Name Provider Type    CK Arielle Ramirez, PT Physical Therapist     Shelia Kendall, RN Registered Nurse    LR Phuong Wei, OT Occupational Therapist                    Occupational Therapy Education       Title: PT OT SLP Therapies (Done)       Topic: Occupational Therapy (Done)       Point: ADL training (Done)       Description:   Instruct learner(s) on proper safety adaptation and remediation techniques during self care or transfers.   Instruct in proper use of assistive devices.                  Learning Progress Summary            Patient Acceptance, E, VU,DU by LR at 1/21/2025 1354   Family Acceptance, E, VU,DU by LR at 1/21/2025 1354                      Point: Precautions (Done)       Description:   Instruct learner(s) on prescribed precautions during self-care and functional transfers.                  Learning Progress Summary            Patient Acceptance, E, VU,DU by LR at 1/21/2025 1354   Family Acceptance, E, VU,DU by LR at 1/21/2025 1354                      Point: Body mechanics (Done)       Description:   Instruct  learner(s) on proper positioning and spine alignment during self-care, functional mobility activities and/or exercises.                  Learning Progress Summary            Patient Acceptance, E, VU,DU by LR at 1/21/2025 1354   Family Acceptance, E, VU,DU by LR at 1/21/2025 1354                                      User Key       Initials Effective Dates Name Provider Type Discipline    LR 10/09/24 -  Phuong Wei OT Occupational Therapist OT                  OT Recommendation and Plan     Plan of Care Review  Plan of Care Reviewed With: patient, spouse  Outcome Evaluation: OT eval complete. Pt presents at baseline with ADL performance and functional mobility. OT skills not warranted. OT signing off. Recommend home with assist at D/C.     Time Calculation:   Evaluation Complexity (OT)  Review Occupational Profile/Medical/Therapy History Complexity: brief/low complexity  Assessment, Occupational Performance/Identification of Deficit Complexity: 1-3 performance deficits  Clinical Decision Making Complexity (OT): problem focused assessment/low complexity  Overall Complexity of Evaluation (OT): low complexity     Time Calculation- OT       Row Name 01/21/25 1438             Time Calculation- OT    OT Start Time 1354  -LR      OT Received On 01/21/25  -LR         Untimed Charges    OT Eval/Re-eval Minutes 40  -LR         Total Minutes    Untimed Charges Total Minutes 40  -LR       Total Minutes 40  -LR                User Key  (r) = Recorded By, (t) = Taken By, (c) = Cosigned By      Initials Name Provider Type    LR Phuong Wei OT Occupational Therapist                  Therapy Charges for Today       Code Description Service Date Service Provider Modifiers Qty    82103541116 HC OT EVAL LOW COMPLEXITY 3 1/21/2025 Phuong Wei OT GO 1                 Phuong Wei OT  1/21/2025

## 2025-01-21 NOTE — CONSULTS
Diabetes Education    Patient Name:  Jorgito Luis  YOB: 1965  MRN: [id number]  Admit Date:  1/20/2025      Chart review for diabetes educator consult.     At the time of this review patient A1c is 5.3% , they have no noted history of diabetes and no home medications noted for treatment of diabetes. At this time we do not feel the patient would benefit from diabetes education. Thank you for this consult, should patient needs change please re consult us.    Electronically signed by:  Zuri Brady RN  01/21/25 12:34 EST

## 2025-01-21 NOTE — H&P
[facility]  HISTORY AND PHYSICAL    Patient Name: Jorgito Luis  : 1965  MRN: [id number]  Primary Care Physician: Meseret Martinez APRN    Subjective   Subjective     Chief Complaint:left arm weakness    HPI:  Jorgito Luis is a 59 y.o. male who  has a past medical history of Arthritis hyperlipidemia and statin intolerance, hypertension, and Sleep apnea. who presented to the emergency room with persistent left arm numbness and weakness.  Patient has had intermittent symptoms over the last week.  He has had some difficulty finding words intermittently has also had some trouble with left leg weakness.  Tonight he presented for stroke evaluation.  MRI did have some abnormal findings could be consistent with a stroke.  As well as some right ICA stenosis.  Has been admitted for further evaluation    Reports that his blood pressure normally runs 140s over 90s, and has been slightly elevated recently.  He has chronically been intolerant to statins for myalgia.  He does coulter significant anxiety.  And disability for prior traumatic event.  He denies any chest pain heaviness tightness, palpitations, headache, changes in vision, any seizure activity or incontinence.    Of note he also reports some difficulty swallowing, he says if he eats an ice cube for send he is not having trouble swallowing but does have trouble getting all his medication down.  Has a prior history of esophageal stricture/hiatal hernia requiring intervention    Personal History       Oncology Problem List:  CA of rectum (2018; Status: Active)  Oncology/Hematology History     PMH: He  has a past medical history of Arthritis, Cancer (), Difficulty in walking, GERD (gastroesophageal reflux disease), Hearing loss, Hypertension, Memory loss, and Sleep apnea.   PSxH: He  has a past surgical history that includes Abdominal surgery (); Esophagogastroduodenoscopy (N/A, 2024); Toe  amputation (Left, 01/2024); Knee arthroscopy (Left); Low anterior bowel resection (04/2015); and Ileostomy closure (06/2015).         FH: His family history includes Alcohol abuse in his father; Arthritis in his father and mother; Cancer in his mother; Diabetes in his father and mother; Heart disease in his father and mother; Hyperlipidemia in his mother; Hypertension in his father and mother; Malig Hypertension in his father and mother; Obesity in his father.   SH: He  reports that he has never smoked. He has never used smokeless tobacco. He reports that he does not drink alcohol and does not use drugs.     Medications:  CBD, Magnesium, Melatonin-Pyridoxine, Theanine, Turmeric, acetaminophen, albuterol, ascorbic acid, busPIRone, cholecalciferol, hydrOXYzine, hydrocortisone, ibuprofen, ivermectin, lidocaine, lisinopril, losartan, polyethylene glycol, psyllium, and sucralfate    Allergies   Allergen Reactions    Vancomycin Itching     Has to be given benadryl before he can take vancomycin    Lansoprazole Other (See Comments) and Rash     Mouth blisters    Statins Myalgia     Hurt his legs when he took them       Objective   Objective     Vital Signs:   Temp:  [98 °F (36.7 °C)] 98 °F (36.7 °C)  Heart Rate:  [74-78] 78  Resp:  [18] 18  BP: (170-181)/(106-128) 170/128    Constitutional: Awake, alert, interactive and pleasant  Eyes: clear sclerae, no conjunctival injection  HENT: NCAT, mucous membranes moist  Neck: no masses or lymphadenopathy, trachea midline  Respiratory: good breath sounds bilaterally, respirations unlabored  Cardiovascular: RRR, no murmurs appreciated, palpable peripheral pulses  Abdomen:  soft, no HSM or masses palpable, not tender or distended  Musculoskeletal: No peripheral edema, clubbing or cyanosis-left first toe amputation clean incision  Neurologic: Oriented x 3,                       Strength symmetric in all extremities                     Cranial Nerves grossly intact, speech clear-though  "some hesitation finding appropriate words  Skin: No rashes or jaundice-he has xanthomas around his eyes, and also has some significant moles scattered especially on his right earlobe  Psychiatric: Appropriate mood, insight      Result Review:  I have personally reviewed the results from the time of this admission   to 1/20/2025 20:21 EST and agree with these findings:  [x]  Laboratory  [x]  Microbiology  [x]  Radiology  [x]  EKG/Telemetry   []  Cardiology/Vascular   []  Pathology  []  Old records  []  Other:  Most notable findings include: MRI reviewed, abnormality reviewed with patient,      LAB RESULTS:      Lab 01/20/25  1557 01/20/25  1554   WBC  --  8.00   HEMOGLOBIN  --  16.1   HEMOGLOBIN, POC 16.7  --    HEMATOCRIT  --  47.1   HEMATOCRIT POC 49  --    PLATELETS  --  315   NEUTROS ABS  --  5.74   IMMATURE GRANS (ABS)  --  0.03   LYMPHS ABS  --  1.45   MONOS ABS  --  0.53   EOS ABS  --  0.17   MCV  --  88.0   PROTIME  --  12.7   APTT  --  30.0         Lab 01/20/25  1557   CREATININE 0.80   EGFR 102.6             Lab 01/20/25  1554   PROTIME 12.7   INR 0.95                 Brief Urine Lab Results       None          No results found for: \"COVID19\"    CT CEREBRAL PERFUSION WITH & WITHOUT CONTRAST    Result Date: 1/20/2025  CT CEREBRAL PERFUSION W WO CONTRAST, CT ANGIOGRAM NECK, CT ANGIOGRAM HEAD Date of Exam: 1/20/2025 6:00 PM EST Indication: Left arm and leg weakness.  Comparison: None available. Technique: Axial CT images of the brain were obtained prior to and after the administration of 115 cc Isovue-370 IV contrast . Core blood volume, core blood flow, mean transit time, and Tmax images were obtained utilizing the Rapid software protocol. A limited CT angiogram of the head was also performed to measure the blood vessel density. The radiation dose reduction device was turned on for each scan per the ALARA (As Low as Reasonably Achievable) protocol. CTA of the head and neck was performed after the uneventful " intravenous administration of iodinated contrast.  Reconstructed coronal and sagittal images were also obtained. In addition, a 3-D volume rendered image was created for interpretation. Automated exposure control and iterative reconstruction methods were used.  FINDINGS: CT perfusion there is no elevated Tmax or decreased cerebral blood flow or blood volume to indicate an infarct. Mild atheromatous disease of the aortic arch the right common carotid artery is normal. Calcified and noncalcified mural thrombus involving the proximal right internal carotid artery with a residual vessel luminal diameter of 1-to 2 mm with a native vessel luminal diameter of right 0.5 cm consistent with between 60 and 80% stenosis per NASCET criteria. Moderate atheromatous disease involving the intracranial segments of the right internal carotid artery. The right carotid terminus is normal. The visualized branches of the right anterior and middle cerebral arteries are normal. The left common carotid artery is normal. Mild atheromatous disease of the left carotid bulb. Noncalcified atheromatous narrowing of the proximal left internal carotid artery with a residual vessel luminal diameter of 3 mm and a native vessel luminal diameter of 5 mm consistent with proximally 40% stenosis per NASCET criteria. Otherwise the extracranial left internal carotid artery is normal. Moderate atheromatous disease involving intracranial segments of the left internal carotid artery. The left carotid terminus is normal. The left anterior cerebral artery arises from the right anterior circulation via a prominent right posterior communicating artery. The visualized branches of the left middle cerebral artery are normal. Both vertebral arteries arise from the subclavian arteries. Both vertebral arteries supply the basilar artery. The basilar artery and basilar artery tip are normal. The basilar artery terminates in bilateral posterior cerebral arteries which  appear normal. The intracranial venous sinuses are patent. Orbital and peripheral soft tissues are normal. Mucosal thickening of the maxillary sinuses. Secretions within the left maxillary sinus. The mastoid air cells are aerated. The parotid and submandibular glands are normal. The airway is clear. The thyroid gland is normal. The lung apices are clear.     Impression: There is between 60 and 80% narrowing of the proximal right internal carotid artery. No vessel occlusion is seen. No evidence of infarct on CT perfusion. Electronically Signed: Genaro Fish MD  1/20/2025 6:34 PM EST  Workstation ID: [id number]    CT Angiogram Head    Result Date: 1/20/2025  CT CEREBRAL PERFUSION W WO CONTRAST, CT ANGIOGRAM NECK, CT ANGIOGRAM HEAD Date of Exam: 1/20/2025 6:00 PM EST Indication: Left arm and leg weakness.  Comparison: None available. Technique: Axial CT images of the brain were obtained prior to and after the administration of 115 cc Isovue-370 IV contrast . Core blood volume, core blood flow, mean transit time, and Tmax images were obtained utilizing the Rapid software protocol. A limited CT angiogram of the head was also performed to measure the blood vessel density. The radiation dose reduction device was turned on for each scan per the ALARA (As Low as Reasonably Achievable) protocol. CTA of the head and neck was performed after the uneventful intravenous administration of iodinated contrast.  Reconstructed coronal and sagittal images were also obtained. In addition, a 3-D volume rendered image was created for interpretation. Automated exposure control and iterative reconstruction methods were used.  FINDINGS: CT perfusion there is no elevated Tmax or decreased cerebral blood flow or blood volume to indicate an infarct. Mild atheromatous disease of the aortic arch the right common carotid artery is normal. Calcified and noncalcified mural thrombus involving the proximal right internal carotid artery with a residual  vessel luminal diameter of 1-to 2 mm with a native vessel luminal diameter of right 0.5 cm consistent with between 60 and 80% stenosis per NASCET criteria. Moderate atheromatous disease involving the intracranial segments of the right internal carotid artery. The right carotid terminus is normal. The visualized branches of the right anterior and middle cerebral arteries are normal. The left common carotid artery is normal. Mild atheromatous disease of the left carotid bulb. Noncalcified atheromatous narrowing of the proximal left internal carotid artery with a residual vessel luminal diameter of 3 mm and a native vessel luminal diameter of 5 mm consistent with proximally 40% stenosis per NASCET criteria. Otherwise the extracranial left internal carotid artery is normal. Moderate atheromatous disease involving intracranial segments of the left internal carotid artery. The left carotid terminus is normal. The left anterior cerebral artery arises from the right anterior circulation via a prominent right posterior communicating artery. The visualized branches of the left middle cerebral artery are normal. Both vertebral arteries arise from the subclavian arteries. Both vertebral arteries supply the basilar artery. The basilar artery and basilar artery tip are normal. The basilar artery terminates in bilateral posterior cerebral arteries which appear normal. The intracranial venous sinuses are patent. Orbital and peripheral soft tissues are normal. Mucosal thickening of the maxillary sinuses. Secretions within the left maxillary sinus. The mastoid air cells are aerated. The parotid and submandibular glands are normal. The airway is clear. The thyroid gland is normal. The lung apices are clear.     Impression: There is between 60 and 80% narrowing of the proximal right internal carotid artery. No vessel occlusion is seen. No evidence of infarct on CT perfusion. Electronically Signed: Genaro Fish MD  1/20/2025 6:34 PM EST   Workstation ID: [id number]    CT Angiogram Neck    Result Date: 1/20/2025  CT CEREBRAL PERFUSION W WO CONTRAST, CT ANGIOGRAM NECK, CT ANGIOGRAM HEAD Date of Exam: 1/20/2025 6:00 PM EST Indication: Left arm and leg weakness.  Comparison: None available. Technique: Axial CT images of the brain were obtained prior to and after the administration of 115 cc Isovue-370 IV contrast . Core blood volume, core blood flow, mean transit time, and Tmax images were obtained utilizing the Rapid software protocol. A limited CT angiogram of the head was also performed to measure the blood vessel density. The radiation dose reduction device was turned on for each scan per the ALARA (As Low as Reasonably Achievable) protocol. CTA of the head and neck was performed after the uneventful intravenous administration of iodinated contrast.  Reconstructed coronal and sagittal images were also obtained. In addition, a 3-D volume rendered image was created for interpretation. Automated exposure control and iterative reconstruction methods were used.  FINDINGS: CT perfusion there is no elevated Tmax or decreased cerebral blood flow or blood volume to indicate an infarct. Mild atheromatous disease of the aortic arch the right common carotid artery is normal. Calcified and noncalcified mural thrombus involving the proximal right internal carotid artery with a residual vessel luminal diameter of 1-to 2 mm with a native vessel luminal diameter of right 0.5 cm consistent with between 60 and 80% stenosis per NASCET criteria. Moderate atheromatous disease involving the intracranial segments of the right internal carotid artery. The right carotid terminus is normal. The visualized branches of the right anterior and middle cerebral arteries are normal. The left common carotid artery is normal. Mild atheromatous disease of the left carotid bulb. Noncalcified atheromatous narrowing of the proximal left internal carotid artery with a residual vessel luminal  diameter of 3 mm and a native vessel luminal diameter of 5 mm consistent with proximally 40% stenosis per NASCET criteria. Otherwise the extracranial left internal carotid artery is normal. Moderate atheromatous disease involving intracranial segments of the left internal carotid artery. The left carotid terminus is normal. The left anterior cerebral artery arises from the right anterior circulation via a prominent right posterior communicating artery. The visualized branches of the left middle cerebral artery are normal. Both vertebral arteries arise from the subclavian arteries. Both vertebral arteries supply the basilar artery. The basilar artery and basilar artery tip are normal. The basilar artery terminates in bilateral posterior cerebral arteries which appear normal. The intracranial venous sinuses are patent. Orbital and peripheral soft tissues are normal. Mucosal thickening of the maxillary sinuses. Secretions within the left maxillary sinus. The mastoid air cells are aerated. The parotid and submandibular glands are normal. The airway is clear. The thyroid gland is normal. The lung apices are clear.     Impression: There is between 60 and 80% narrowing of the proximal right internal carotid artery. No vessel occlusion is seen. No evidence of infarct on CT perfusion. Electronically Signed: Genaro Fish MD  1/20/2025 6:34 PM EST  Workstation ID: [id number]    CT Head Without Contrast    Result Date: 1/20/2025  CT HEAD WO CONTRAST Date of Exam: 1/20/2025 6:00 PM EST Indication: left arm and leg weakness. Comparison: None available. Technique: Axial CT images were obtained of the head without contrast administration.  Automated exposure control and iterative construction methods were used. Findings: There is no evidence of acute infarct or hemorrhage. No abnormal extra-axial fluid collections are identified. There is no mass effect or hydrocephalus. Globes and orbits are within normal limits. There is a polyp or  mucous retention cyst within the left maxillary sinus.     Impression: Impression: 1. No acute intracranial abnormality. Findings called to Michelle with stroke team at 6:28 p.m. on 1/20/2025 Electronically Signed: Morro Brantley MD  1/20/2025 6:28 PM EST  Workstation ID: [id number]    MRI Cervical Spine Without Contrast    Result Date: 1/20/2025  MRI CERVICAL SPINE WO CONTRAST Date of Exam: 1/20/2025 4:50 PM EST Indication: left arm weakness, paresthesia.  Comparison: None available. Technique:  Routine multiplanar/multisequence sequence images of the cervical spine were obtained without contrast administration.  Findings: There is normal height, alignment, and signal intensity of the cervical vertebral bodies. There is normal signal within the substance of the spinal cord. The craniovertebral junction appears normal. There is disc desiccation and disc base narrowing throughout the cervical spine. Axial images demonstrate: C2/3: No significant disc bulge. Facet joints are within normal limits. No spinal canal stenosis or neural foraminal narrowing C3/4: No significant disc bulge. Facet joints are within normal limits. No spinal canal stenosis or neural foraminal narrowing C4/5: No significant disc bulge. There is bilateral uncovertebral joint spurring degenerative facet change resulting in mild bilateral neural foraminal narrowing right greater than left. C5/6: Minimal posterior disc bulge. There is mild uncovertebral joint spurring and degenerative facet change resulting in mild bilateral neural foraminal narrowing. C6/7: No significant disc bulge. There are mild degenerative facet changes bilaterally. No spinal canal stenosis or neural foraminal narrowing. C7/T1: Small right paracentral disc bulge. No significant canal stenosis. No neural foraminal narrowing. Facet joints are within normal limits.     Impression: Impression: 1. Minimal degenerative disc disease and degenerative facet change resulting in mild  multilevel neural foraminal narrowing. No spinal canal stenosis. 2. Normal signal seen throughout the substance of the visualized spinal cord. Electronically Signed: Morro Brantley MD  1/20/2025 6:04 PM EST  Workstation ID: [id number]    MRI Brain Without Contrast    Result Date: 1/20/2025  MRI BRAIN WO CONTRAST Date of Exam: 1/20/2025 4:50 PM EST Indication: left sided weakness.  Comparison: None available. Technique:  Routine multiplanar/multisequence sequence images of the brain were obtained without contrast administration. Findings: Ringlike areas of questionable restricted diffusion versus T2 shine through and ADC signal dropout involving the right corpus callosum seen on FLAIR image #17 series 6. No associated blood products. Multifocal areas of T2/FLAIR signal increase are noted within the subcortical, deep cerebral, and periventricular white matter consistent with chronic small vessel/microangiopathic ischemic changes. The ventricles and sulci are normal in size and configuration. . No extra-axial mass or collection. The posterior fossa is normal. Sellar and suprasellar structures are normal. The major intracranial flow-voids of the [place] are patent. Orbital and periorbital soft tissues are normal. The visualized paranasal sinuses and ethmoid air cells are aerated. The mastoid air cells are aerated.. 2.8 cm x 0.7 cm fatty lesion arising from the outer table of the skull consistent with a scalp lipoma.     Impression: Impression: Ring-enhancing area of possible restricted diffusion versus T2 shine through on ADC signal changes involving the right corpus callosum. This may represent a small infarct. Consider an MRI of the brain with contrast to exclude demyelinating disease or much less likely an underlying mass. Electronically Signed: Genaro Fish MD  1/20/2025 5:34 PM EST  Workstation ID: [id number]    XR Chest 1 View    Result Date: 1/20/2025  XR CHEST 1 VW Date of Exam: 1/20/2025 4:04 PM EST  Indication: Acute Stroke Protocol (onset < 12 hrs) Comparison: 9/11/2023 Findings: The heart, mediastinum and pulmonary vasculature appear within normal limits. The lungs appear well expanded and clear except for thin linear scarring in the area of the lingula, which is stable. No edema, effusion or pneumothorax is seen. Bony structures appear to be intact.     Impression: Impression: No evidence of active chest disease. Electronically Signed: Desmond Edmondson MD  1/20/2025 4:26 PM EST  Workstation ID: [id number]         The patient qualifies to receive the vaccine, but they have not yet received it.    Assessment & Plan   Assessment / Plan       Left arm weakness    Hyperlipidemia    Essential hypertension    ARMAND (generalized anxiety disorder)    Stenosis of right carotid artery      Assessment & Plan:  59-year-old with history of hypertension, hyperlipidemia-extreme with statin intolerance, anxiety, rectal cancer presents with left arm weakness, abnormal MRI    Subacute infarct seen on MRI  Right ICA stenosis seen on CTA-  Hyperlipidemia  - continue with stroke evaluation, PT OT consults, aspirin,   --patient is statin intolerant due to severe myalgias consider started of treatment  -Stroke team to follow, consider intervention of ICA stenosis  -Echocardiogram, evaluate for arrhythmia  -check lipids    Hypertension  Permissive for now, hold medications    VTE Prophylaxis: SCDs      CODE STATUS: Full code     Expected Discharge  Expected Discharge Date: 1/22/2025; Expected Discharge Time:     Electronically signed by Lia Allen MD 01/20/25 20:21 EST

## 2025-01-21 NOTE — PROGRESS NOTES
Stroke Progress Note       Chief Complaint: Paresthesia of the left chest/axillary area    Subjective    Subjective     Subjective:  The patient is lying down in the bed in NAD.  The wife was at the bedside. The patient stated that he is doing better today compared to yesterday.  Denies having any new stroke or strokelike symptoms.  I have a detailed discussion with the patient and his wife regarding what possibly caused his symptoms including possibility of having inflammatory/demyelinating process aside from possible vascular etiology.  The patient expressed concerns about some of the medications that he used in the past for secondary stroke prevention such as atorvastatin as it caused him to have muscle aches.  He also expressed some concern about using blood thinning medications due to his history of rectal cancer with resection that was done in the past.  The patient is agreeable to trying some of these medications with some modification and watch for any possible side effect at this time.  I also suggested the need to do lumbar puncture to rule out possible inflammatory/autoimmune etiologies, however the patient did not want to pursue this during this hospital stay and may be considering them as an outpatient if any more symptoms happen.  All patient's questions and concerns were answered.    No other acute complains at this time    Review of Systems   Constitutional: No fatigue        Objective      Temp:  [97.4 °F (36.3 °C)-98 °F (36.7 °C)] 97.7 °F (36.5 °C)  Heart Rate:  [60-79] 60  Resp:  [16-18] 16  BP: (150-181)/() 157/102    Objective    GEN: lying in bed; in NAD  HENT: normocephalic, non-erythematous oropharynx  CV: no LE edema    NEURO:  Mental Status: A&O x 3, interactive, able to follow commands  Speech: Intact Articulation  CN 2-12:  II - PERRLA  III, IV, VI - EOMI  V - Facial sensation intact  VII -no gross facial asymmetry  VIII - Auditory acuity intact  XII - Tongue protrudes  midline    Motor:  RUE: 5/5  LUE: 5/5  RLE: 5/5  LLE: 5/5    Sensory: intact light touch throughout  Reflexes: negative Ramirez's sign BL  Coordination: no ataxia with finger-to-nose testing  Gait/Station: deferred     Results Review:    I reviewed the patient's new clinical results.    WBC   Date Value Ref Range Status   01/20/2025 8.00 3.40 - 10.80 10*3/mm3 Final     RBC   Date Value Ref Range Status   01/20/2025 5.35 4.14 - 5.80 10*6/mm3 Final     Hemoglobin   Date Value Ref Range Status   01/20/2025 16.7 12.0 - 17.0 g/dL Final     Comment:     Serial Number: [id number]:  [id number]   01/20/2025 16.1 13.0 - 17.7 g/dL Final     Hematocrit   Date Value Ref Range Status   01/20/2025 49 38 - 51 % Final   01/20/2025 47.1 37.5 - 51.0 % Final     MCV   Date Value Ref Range Status   01/20/2025 88.0 79.0 - 97.0 fL Final     MCH   Date Value Ref Range Status   01/20/2025 30.1 26.6 - 33.0 pg Final     MCHC   Date Value Ref Range Status   01/20/2025 34.2 31.5 - 35.7 g/dL Final     RDW   Date Value Ref Range Status   01/20/2025 12.8 12.3 - 15.4 % Final     RDW-SD   Date Value Ref Range Status   01/20/2025 41.4 37.0 - 54.0 fl Final     MPV   Date Value Ref Range Status   01/20/2025 10.0 6.0 - 12.0 fL Final     Platelets   Date Value Ref Range Status   01/20/2025 315 140 - 450 10*3/mm3 Final     Neutrophil %   Date Value Ref Range Status   01/20/2025 71.8 42.7 - 76.0 % Final     Lymphocyte %   Date Value Ref Range Status   01/20/2025 18.1 (L) 19.6 - 45.3 % Final     Monocyte %   Date Value Ref Range Status   01/20/2025 6.6 5.0 - 12.0 % Final     Eosinophil %   Date Value Ref Range Status   01/20/2025 2.1 0.3 - 6.2 % Final     Basophil %   Date Value Ref Range Status   01/20/2025 1.0 0.0 - 1.5 % Final     Immature Grans %   Date Value Ref Range Status   01/20/2025 0.4 0.0 - 0.5 % Final     Neutrophils, Absolute   Date Value Ref Range Status   01/20/2025 5.74 1.70 - 7.00 10*3/mm3 Final     Lymphocytes, Absolute   Date Value Ref  Range Status   01/20/2025 1.45 0.70 - 3.10 10*3/mm3 Final     Monocytes, Absolute   Date Value Ref Range Status   01/20/2025 0.53 0.10 - 0.90 10*3/mm3 Final     Eosinophils, Absolute   Date Value Ref Range Status   01/20/2025 0.17 0.00 - 0.40 10*3/mm3 Final     Basophils, Absolute   Date Value Ref Range Status   01/20/2025 0.08 0.00 - 0.20 10*3/mm3 Final     Immature Grans, Absolute   Date Value Ref Range Status   01/20/2025 0.03 0.00 - 0.05 10*3/mm3 Final     nRBC   Date Value Ref Range Status   01/20/2025 0.0 0.0 - 0.2 /100 WBC Final       Lab Results   Component Value Date    GLUCOSE 104 (H) 02/03/2015    BUN 9 02/03/2015    CREATININE 0.80 01/20/2025     02/03/2015    K 4.2 02/03/2015     02/03/2015    CALCIUM 8.6 (L) 02/03/2015    PROTEINTOT 5.8 02/03/2015    ALBUMIN 3.6 02/03/2015    ALT 23 02/03/2015    AST 19 02/03/2015    ALKPHOS 45 02/03/2015    BILITOT 1.0 02/03/2015    BCR 12.9 10/16/2014    ANIONGAP 11 02/03/2015    EGFR 102.6 01/20/2025     MRI Brain With & Without Contrast    Result Date: 1/21/2025  Impression: The addition of postcontrast sequences demonstrates no enhancement associated with the diffusion restricting ringlike area of T2 hyperintensity seen within the corpus callosum on noncontrast exam. The appearance of this lesion remains most concerning for  an area of potential subacute demyelination. Additional tiny areas of diffusion restriction seen along cortical margins of the right pre and postcentral gyrus demonstrate corresponding enhancement, favoring areas of acute infarct. While neoplasm is considered significantly less likely, follow-up contrast-enhanced MRI could be useful to ensure expected evolution of presumed small areas of infarct. No evidence of demyelinating process or other focal signal abnormality of the thoracic spinal cord, conus medullaris and cauda equina nerve roots. Minimal thoracic and lumbar spondylosis is present, without associated areas of significant  spinal canal or  neuroforaminal narrowing. Electronically Signed: Alfred Choudhury MD  1/21/2025 5:47 AM EST  Workstation ID: [id number]    MRI Lumbar Spine With & Without Contrast    Result Date: 1/21/2025  Impression: The addition of postcontrast sequences demonstrates no enhancement associated with the diffusion restricting ringlike area of T2 hyperintensity seen within the corpus callosum on noncontrast exam. The appearance of this lesion remains most concerning for  an area of potential subacute demyelination. Additional tiny areas of diffusion restriction seen along cortical margins of the right pre and postcentral gyrus demonstrate corresponding enhancement, favoring areas of acute infarct. While neoplasm is considered significantly less likely, follow-up contrast-enhanced MRI could be useful to ensure expected evolution of presumed small areas of infarct. No evidence of demyelinating process or other focal signal abnormality of the thoracic spinal cord, conus medullaris and cauda equina nerve roots. Minimal thoracic and lumbar spondylosis is present, without associated areas of significant spinal canal or  neuroforaminal narrowing. Electronically Signed: Alfred Choudhury MD  1/21/2025 5:47 AM EST  Workstation ID: [id number]    MRI Thoracic Spine With & Without Contrast    Result Date: 1/21/2025  Impression: The addition of postcontrast sequences demonstrates no enhancement associated with the diffusion restricting ringlike area of T2 hyperintensity seen within the corpus callosum on noncontrast exam. The appearance of this lesion remains most concerning for  an area of potential subacute demyelination. Additional tiny areas of diffusion restriction seen along cortical margins of the right pre and postcentral gyrus demonstrate corresponding enhancement, favoring areas of acute infarct. While neoplasm is considered significantly less likely, follow-up contrast-enhanced MRI could be useful to ensure expected evolution  of presumed small areas of infarct. No evidence of demyelinating process or other focal signal abnormality of the thoracic spinal cord, conus medullaris and cauda equina nerve roots. Minimal thoracic and lumbar spondylosis is present, without associated areas of significant spinal canal or  neuroforaminal narrowing. Electronically Signed: Alfred Choudhury MD  1/21/2025 5:47 AM EST  Workstation ID: [id number]    MRI Brain Without Contrast    Addendum Date: 1/20/2025    ADDENDUM #1 Tiny foci of restricted diffusion with ADC signal dropout noted involving the cortical gray matter of the left frontal lobe, the right parietal lobe, and with hazy involvement of the right frontal lobe. These are concerning for tiny infarcts as well. A voice recognition error is present in the impression of the ringlike structure. The examination was performed without contrast and therefore it represents a ringlike area of increased T2 or FLAIR signal and not ring enhancement. Electronically Signed: Genaro Fish MD  1/20/2025 9:50 PM EST  Workstation ID: [id number] ORIGINAL REPORT: MRI BRAIN WO CONTRAST Date of Exam: 1/20/2025 4:50 PM EST Indication: left sided weakness.  Comparison: None available. Technique:  Routine multiplanar/multisequence sequence images of the brain were obtained without contrast administration. Findings: Ringlike areas of questionable restricted diffusion versus T2 shine through and ADC signal dropout involving the right corpus callosum seen on FLAIR image #17 series 6. No associated blood products. Multifocal areas of T2/FLAIR signal increase are noted within the subcortical, deep cerebral, and periventricular white matter consistent with chronic small vessel/microangiopathic ischemic changes. The ventricles and sulci are normal in size and configuration. . No extra-axial mass or collection. The posterior fossa is normal. Sellar and suprasellar structures are normal. The major intracranial flow-voids of the [place]  [place] are patent. Orbital and periorbital soft tissues are normal. The visualized paranasal sinuses and ethmoid air cells are aerated. The mastoid air cells are aerated.. 2.8 cm x 0.7 cm fatty lesion arising from the outer table of the skull consistent with a scalp lipoma. Impression: Ring-enhancing area of possible restricted diffusion versus T2 shine through on ADC signal changes involving the right corpus callosum. This may represent a small infarct. Consider an MRI of the brain with contrast to exclude demyelinating disease or much less likely an underlying mass. Electronically Signed: Genaro Fish MD  1/20/2025 5:34 PM EST  Workstation ID: [id number]    Result Date: 1/20/2025  Impression: Ring-enhancing area of possible restricted diffusion versus T2 shine through on ADC signal changes involving the right corpus callosum. This may represent a small infarct. Consider an MRI of the brain with contrast to exclude demyelinating disease or much less likely an underlying mass. Electronically Signed: Genaro Fish MD  1/20/2025 5:34 PM EST  Workstation ID: [id number]    CT CEREBRAL PERFUSION WITH & WITHOUT CONTRAST    Result Date: 1/20/2025  There is between 60 and 80% narrowing of the proximal right internal carotid artery. No vessel occlusion is seen. No evidence of infarct on CT perfusion. Electronically Signed: Genaro Fish MD  1/20/2025 6:34 PM EST  Workstation ID: [id number]    CT Angiogram Head    Result Date: 1/20/2025  There is between 60 and 80% narrowing of the proximal right internal carotid artery. No vessel occlusion is seen. No evidence of infarct on CT perfusion. Electronically Signed: Genaro Fish MD  1/20/2025 6:34 PM EST  Workstation ID: [id number]    CT Angiogram Neck    Result Date: 1/20/2025  There is between 60 and 80% narrowing of the proximal right internal carotid artery. No vessel occlusion is seen. No evidence of infarct on CT perfusion. Electronically Signed: Genaro Fish MD  1/20/2025 6:34 PM EST   Workstation ID: [id number]    CT Head Without Contrast    Result Date: 1/20/2025  Impression: 1. No acute intracranial abnormality. Findings called to Michelle with stroke team at 6:28 p.m. on 1/20/2025 Electronically Signed: Morro Brantley MD  1/20/2025 6:28 PM EST  Workstation ID: [id number]    MRI Cervical Spine Without Contrast    Result Date: 1/20/2025  Impression: 1. Minimal degenerative disc disease and degenerative facet change resulting in mild multilevel neural foraminal narrowing. No spinal canal stenosis. 2. Normal signal seen throughout the substance of the visualized spinal cord. Electronically Signed: Morro Brantley MD  1/20/2025 6:04 PM EST  Workstation ID: [id number]    XR Chest 1 View    Result Date: 1/20/2025  Impression: No evidence of active chest disease. Electronically Signed: Desmond Edmondson MD  1/20/2025 4:26 PM EST  Workstation ID: [id number]       -CTA of the head and neck images from 1/20/2025 were personally reviewed and showed no flow limiting stenosis or LVO.  There was moderate stenosis of the right cervical ICA  -MRI brain with and without contrast images from 1/21/2025 were personally reviewed and showed 2 punctate areas of restricted diffusion on the right posterior frontal and left anterior frontal lobes that could represent ischemia however there was a ring-enhancing lesion on the corpus callosum that correlate more with possible demyelinating process.  -Transthoracic echocardiogram from 1/21/2025 report was personally reviewed and showed left ventricular ejection fraction of 61 to 65%, no left atrial dilation and negative bubble study  -A1c from 1/21/2025 was 5.3%  -LDL from 1/21/2025 was 357      Assessment/Plan      this is a 59-year-old white male, right-handed with multiple vascular risk factor who presents to [facility] ED for left upper extremity numbness and heaviness on and off and decreased fine motor function over the last 2 weeks with persistence till today that lasted longer than usual  that prompted patient to go to the ED for further evaluation.  Patient is deemed to be not a candidate for IV thrombolytic therapy secondary to last normal well outside the window, patient is deemed not to be a candidate for mechanical thrombectomy no LVO or perfusion deficit on CTA head and neck and CT perfusion.     Antiplatelet PTA: None  Anticoagulant PTA: None                                                                                #Left upper extremity paresthesias and paresis, decreased fine motor function to left hand.  #Multifocal acute punctate infarct found on MRI   -Etiology; given the enhanced ring at right corpus callosum white matter related to demyelination versus less likely white matter microvascular disease.  The patient sagittal FLAIR images showed multiple lesions consistent with Fingers and Most White Matter Hyperintense Lesions Affecting the Cortical Subcortical Junction Consistent with a Demyelinating Process.  The Patient Has Uncontrolled Vascular Risk Factors Such As Hypertension and Hyperlipidemia Which Put Him at Risk of Developing Strokes which cannot be ruled out completely with this MRI  -CTA of the head and neck images from 1/20/2025 were personally reviewed and showed no flow limiting stenosis or LVO.  There was moderate stenosis of the right cervical ICA  -MRI brain with and without contrast images from 1/21/2025 were personally reviewed and showed 2 punctate areas of restricted diffusion on the right posterior frontal and left anterior frontal lobes that could represent ischemia however there was a ring-enhancing lesion on the corpus callosum that correlate more with possible demyelinating process.  -Transthoracic echocardiogram from 1/21/2025 report was personally reviewed and showed left ventricular ejection fraction of 61 to 65%, no left atrial dilation and negative bubble study  -A1c from 1/21/2025 was 5.3%  -LDL from 1/21/2025 was 357  -Detailed discussion with the  patient regarding the need for further workup including lumbar puncture to rule out possible demyelinating/inflammatory etiology of white matter lesions.  The patient is not wanting to as an inpatient.  Stated that if he is to have symptoms again he will consider doing further testing.    Recommendations  -Continue DAPT, aspirin 81 mg and Plavix 75 mg loading dose for 21 days then monotherapy with aspirin  -If no A-fib on telemetry patient will need cardiac monitor on discharge.  -Will discontinue atorvastatin and replace with rosuvastatin 40 mg  p.o. at night and daily for secondary stroke prevention monitor any myalgia.  Target LDL level less than 70  -Would recommend starting Zetia 10 mg for secondary stroke prevention.  Target LDL level of less than 70  -Recommend general neurology consult for further recommendation regarding the possible demyelinating/inflammatory etiology of white matter lesions  -Blood pressure goals of normotension  -PT\OT\SLP evaluation  -Case management for final discharge planning  -[facility]\neurocheck per protocol     #Essential hypertension with uncontrolled blood pressure  -Target blood pressure goals of normotension as detailed above     #Hyperlipidemia  -Rosuvastatin and Zetia as detailed above     #Medication noncompliance  -Counseling on critical adherence to medical management to avoid complications, and stroke secondary prevention's, patient is agreeable and verbalized understanding     #Great toe amputation in the setting of prior history of work-related injury  #Gait instability using a cane  -PT\OT evaluation  -Case management for final discharge planning     #Overweight  -BMI 28.66  -Complicates all aspects of care  -Counseling on healthy diet exercise and weight loss    Patient education: call 911 or present to emergency department with any stroke symptom, including unilateral face, arm, or leg weakness, numbness, or paresthesias, unilateral facial droop, speech deficits,  dizziness with nausea, vomiting, nystagmus, and incoordination, visual deficits, or severe onset headache.    Stroke will sign off. Please call for any further questions or concerns  =================================  Bobby Babb MD, Msc, PhD  Vascular Neurologist  [facility]

## 2025-01-21 NOTE — PLAN OF CARE
Goal Outcome Evaluation:  Plan of Care Reviewed With: patient, spouse           Outcome Evaluation: Patient presents with mild balance and endurance deficits that are present at his baseline. He was able to ambulate 300' CGA with his SPC with no LOB and reports resolution of his admitting symptoms. No IPPT needs identified due to baseline mobility. Will sign off and recommend D/C home when medically appropriate.    Anticipated Discharge Disposition (PT): home

## 2025-01-21 NOTE — THERAPY EVALUATION
"Acute Care - Speech Language Pathology   Swallow Initial Evaluation [facility]  Clinical Swallow Evaluation  Cognitive-Communication Evaluation       Patient Name: Jorgito Luis  : 1965  MRN: [id number]  Today's Date: 2025               Admit Date: 2025    Visit Dx:     ICD-10-CM ICD-9-CM   1. Acute left-sided weakness  R53.1 728.87   2. Paresthesias  R20.2 782.0   3. Acute ischemic stroke  I63.9 434.91   4. Stenosis of right carotid artery  I65.21 433.10     Patient Active Problem List   Diagnosis    CA of rectum    Esophageal obstruction due to food impaction    Ulcer of esophagus without bleeding    Esophageal dysphagia    Left arm weakness    Hyperlipidemia    Status post amputation of left great toe    Essential hypertension    ARMAND (generalized anxiety disorder)    Stenosis of right carotid artery     Past Medical History:   Diagnosis Date    Arthritis     Cancer     colon-rectum    Difficulty in walking     GERD (gastroesophageal reflux disease)     history of    Hearing loss     Hypertension     states he takes his meds \"as needed\" based on blood pressures - 24    Memory loss     Sleep apnea     does not use CPAP     Past Surgical History:   Procedure Laterality Date    ABDOMINAL SURGERY      colon cancer removed rectum - [facility]    ENDOSCOPY N/A 2024    Procedure: ESOPHAGOGASTRODUODENOSCOPY WITH FORIEGN BODY REMOVAL;  Surgeon: Jacki Perera MD;  Location: [facility] ENDOSCOPY;  Service: Gastroenterology;  Laterality: N/A;    ILEOSTOMY CLOSURE  2015    [place]    KNEE ARTHROSCOPY Left     meniscus    LOW ANTERIOR BOWEL RESECTION  2015    with ileostomy; [place] - see care everywhere    TOE AMPUTATION Left 2024       SLP Recommendation and Plan  SLP Swallowing Diagnosis: swallow WFL/no suspected pharyngeal impairment, suspected esophageal dysphagia (25 0940)  SLP Diet Recommendation: regular textures, thin liquids (25 0940)  Recommended " Precautions and Strategies: general aspiration precautions, reflux precautions, upright posture during/after eating (01/21/25 0940)  SLP Rec. for Method of Medication Administration: meds whole, meds crushed, with puree, as tolerated (01/21/25 0940)     Monitor for Signs of Aspiration: yes, notify SLP if any concerns (01/21/25 0940)  Recommended Diagnostics: No further SLP services recommended (01/21/25 0940)  Swallow Criteria for Skilled Therapeutic Interventions Met: no problems identified which require skilled intervention (01/21/25 0940)  Anticipated Discharge Disposition (SLP): unknown, other (see comments) (pending further w/u) (01/21/25 0940)     Therapy Frequency (Swallow): evaluation only (01/21/25 0940)  Predicted Duration Therapy Intervention (Days): 1 week (01/21/25 0940)  Oral Care Recommendations: Oral Care BID/PRN, Toothbrush (01/21/25 0940)  Demonstrates Need for Referral to Another Service: gastroenterology, dedicated esophageal assessment (01/21/25 0940)  Swallowing Considerations per Physician Discretion: medical management of suspected esophageal dysphagia, as indicated (01/21/25 0940)                                         SWALLOW EVALUATION (Last 72 Hours)       SLP Adult Swallow Evaluation       Row Name 01/21/25 0940                   Rehab Evaluation    Patient Effort good  -MH        Symptoms Noted During/After Treatment none  -MH           General Information    Patient Profile Reviewed yes  -MH        Pertinent History Of Current Problem Adm with c/o L arm weakness. Hx: HLD, HTN, sleep apnea, esophageal obstruction 2' food impactation. CXR w/o evidence of acute disease processes. MRI w/ tiny areas of diffusion restriction along cortical margins of R pre & postcentral gyrus  -MH        Current Method of Nutrition regular textures;thin liquids  -        Precautions/Limitations, Vision WFL;for purposes of eval  -        Precautions/Limitations, Hearing WFL;for purposes of eval  -      "   Prior Level of Function-Communication unknown  -        Prior Level of Function-Swallowing esophageal concerns;other (see comments)  Per pt report  -        Plans/Goals Discussed with patient;family;agreed upon  [facility]        Barriers to Rehab none identified  -        Patient's Goals for Discharge patient did not state  -        Family Goals for Discharge family did not state  -           Pain    Additional Documentation Pain Scale: FACES Pre/Post-Treatment (Group)  -           Pain Scale: FACES Pre/Post-Treatment    Pain: FACES Scale, Pretreatment 0-->no hurt  -        Posttreatment Pain Rating 0-->no hurt  -           Oral Motor Structure and Function    Dentition Assessment natural, present and adequate  -        Secretion Management WNL/WFL  -        Mucosal Quality moist, healthy  -           Oral Musculature and Cranial Nerve Assessment    [facility]           General Eating/Swallowing Observations    Respiratory Support Currently in Use room air  -        Eating/Swallowing Skills self-fed;fed by SLP  -        Positioning During Eating upright 90 degree;upright in bed  -        Utensils Used spoon;cup;straw  -        Consistencies Trialed ice chips;thin liquids;pureed;regular textures  -           Clinical Swallow Eval    Pharyngeal Phase no overt signs/symptoms of pharyngeal impairment  -        Esophageal Phase suspected esophageal impairment  -        Clinical Swallow Evaluation Summary No overt s/s of aspiration across trials of thin liquid, pureed, or regular solid consistencies; even when pushed for consecutive sips of thin. Adequate oral manipulation/mastication of regular solid trial and no significant residue. Pt reports difficulty w/ PO meds @ baseline, stating they \"stick & hang in throat\". Ok to cont regular diet w/ thin liquids, general aspiration/reflux precautions. Meds whole/crushed in pureed as tolerated. Pt may benefit from GI " consult given esophageal concerns. SLP will sign off for dysphagia, please reconsult if further concerns arise  -           Esophageal Phase Concerns    Esophageal Phase Concerns globus;sensation of material sticking  -           SLP Evaluation Clinical Impression    SLP Swallowing Diagnosis swallow WFL/no suspected pharyngeal impairment;suspected esophageal dysphagia  -        Swallow Criteria for Skilled Therapeutic Interventions Met no problems identified which require skilled intervention  -           Recommendations    Therapy Frequency (Swallow) evaluation only  -        SLP Diet Recommendation regular textures;thin liquids  -        Recommended Diagnostics No further SLP services recommended  -        Recommended Precautions and Strategies general aspiration precautions;reflux precautions;upright posture during/after eating  -        Oral Care Recommendations Oral Care BID/PRN;Toothbrush  -        SLP Rec. for Method of Medication Administration meds whole;meds crushed;with puree;as tolerated  -        Monitor for Signs of Aspiration yes;notify SLP if any concerns  -        Anticipated Discharge Disposition (SLP) unknown;other (see comments)  pending further w/u  -        Demonstrates Need for Referral to Another Service gastroenterology;dedicated esophageal assessment  -        Swallowing Considerations per Physician Discretion medical management of suspected esophageal dysphagia, as indicated  -                  User Key  (r) = Recorded By, (t) = Taken By, (c) = Cosigned By      Initials Name Effective Dates     Niya Eli, MS [facility]-SLP 05/12/23 -                     EDUCATION  The patient has been educated in the following areas:   Dysphagia (Swallowing Impairment) Oral Care/Hydration.        SLP GOALS       Row Name 01/21/25 8298             SLP Diagnostic Treatment     Patient will participate in further assessment in the following areas reading comprehension;graphic  expression;cognitive-linguistic  -      Time Frame (Diagnostic) 1 week  -      Progress/Outcomes (Additional Goal 1, SLP) new goal  -                User Key  (r) = Recorded By, (t) = Taken By, (c) = Cosigned By      Initials Name Provider Type    Niya Thomason MS CCC-SLP Speech and Language Pathologist                         Time Calculation:    Time Calculation- SLP       Row Name 25 1130             Time Calculation- SLP    SLP Start Time 0940  -      SLP Received On 25  -         Untimed Charges    08342-OH Eval Speech and Production w/ Language Minutes 20  -MH      67017-GT Eval Oral Pharyng Swallow Minutes 40  -MH         Total Minutes    Untimed Charges Total Minutes 60  -MH       Total Minutes 60  -MH                User Key  (r) = Recorded By, (t) = Taken By, (c) = Cosigned By      Initials Name Provider Type    Niya Thomason MS CCC-SLP Speech and Language Pathologist                    Therapy Charges for Today       Code Description Service Date Service Provider Modifiers Qty    01710197073 HC ST EVAL ORAL PHARYNG SWALLOW 3 2025 Niya Eli MS CCC-SLP GN 1    61914107959 HC ST EVAL SPEECH AND PROD W LANG  1 2025 Niya Eli MS CCC-SLP GN 1                 Niya Eli MS CCC-SLP  2025   and Acute Care - Speech Language Pathology Initial Evaluation  [facility]     Patient Name: Jorgito Luis  : 1965  MRN: [id number]  Today's Date: 2025               Admit Date: 2025     Visit Dx:    ICD-10-CM ICD-9-CM   1. Acute left-sided weakness  R53.1 728.87   2. Paresthesias  R20.2 782.0   3. Acute ischemic stroke  I63.9 434.91   4. Stenosis of right carotid artery  I65.21 433.10     Patient Active Problem List   Diagnosis    CA of rectum    Esophageal obstruction due to food impaction    Ulcer of esophagus without bleeding    Esophageal dysphagia    Left arm weakness    Hyperlipidemia    Status post amputation of left great  "toe    Essential hypertension    ARMAND (generalized anxiety disorder)    Stenosis of right carotid artery     Past Medical History:   Diagnosis Date    Arthritis     Cancer 2014    colon-rectum    Difficulty in walking     GERD (gastroesophageal reflux disease)     history of    Hearing loss     Hypertension     states he takes his meds \"as needed\" based on blood pressures - 5/20/24    Memory loss     Sleep apnea     does not use CPAP     Past Surgical History:   Procedure Laterality Date    ABDOMINAL SURGERY  2014    colon cancer removed rectum - [facility]    ENDOSCOPY N/A 04/05/2024    Procedure: ESOPHAGOGASTRODUODENOSCOPY WITH FORIEGN BODY REMOVAL;  Surgeon: Jacki Perera MD;  Location: [facility] ENDOSCOPY;  Service: Gastroenterology;  Laterality: N/A;    ILEOSTOMY CLOSURE  06/2015    [place]    KNEE ARTHROSCOPY Left     meniscus    LOW ANTERIOR BOWEL RESECTION  04/2015    with ileostomy; [place] - see care everywhere    TOE AMPUTATION Left 01/2024       SLP Recommendation and Plan  SLP Diagnosis: functional speech/language skills (01/21/25 0940)  SLP Diagnosis Comments: Speech and language abilties are grossly functional @ simple level. U/a to fully assess cognitive linguistic abilties 2' pt recieving care from other provider. SLP will f/u for dx tx as appropriate (01/21/25 0940)     Monitor for Signs of Aspiration: yes, notify SLP if any concerns (01/21/25 0940)  Swallow Criteria for Skilled Therapeutic Interventions Met: no problems identified which require skilled intervention (01/21/25 0940)  SLC Criteria for Skilled Therapy Interventions Met: yes (01/21/25 0940)  Anticipated Discharge Disposition (SLP): unknown, other (see comments) (pending further w/u) (01/21/25 0940)  Demonstrates Need for Referral to Another Service: gastroenterology, dedicated esophageal assessment (01/21/25 0940)  Therapy Frequency (Swallow): evaluation only (01/21/25 0940)  Therapy Frequency (SLP SLC): 3 days per week " (01/21/25 0940)  Predicted Duration Therapy Intervention (Days): 1 week (01/21/25 0940)  Oral Care Recommendations: Oral Care BID/PRN, Toothbrush (01/21/25 0940)                                 SLP EVALUATION (Last 72 Hours)       SLP SLC Evaluation       Row Name 01/21/25 0940                   Communication Assessment/Intervention    Document Type evaluation  -        Subjective Information no complaints  -        Patient Observations alert;cooperative  -        Patient/Family/Caregiver Comments/Observations Spouse present  -           General Information    Prior Level of Function-Communication unknown  -        Patient's Goals for Discharge patient did not state  -        Family Goals for Discharge family did not state  -           Comprehension Assessment/Intervention    Comprehension Assessment/Intervention Auditory Comprehension  -           Auditory Comprehension Assessment/Intervention    Auditory Comprehension (Communication) [facility]        Able to Identify Objects/Pictures (Communication) body part;familiar objects;pictures of common objects;[facility]        Answers Questions (Communication) yes/no;wh questions;personal;simple;[facility]        Able to Follow Commands (Communication) 1-step;2-step;[facility]           Expression Assessment/Intervention    Expression Assessment/Intervention verbal expression  -           Verbal Expression Assessment/Intervention    Verbal Expression other (see comments)  Fxnl @ simple level  -        Automatic Speech (Communication) response to greeting;months of year;days of week;[facility]        Repetition sentences;[facility]        Phrase Completion automatic/predictable;[facility]        Responsive Naming simple;[facility]        Confrontational Naming high frequency;[facility]        Spontaneous/Functional Words simple;[facility]        Sentence Formulation simple;[facility]        Conversational Discourse/Fluency [facility]           Motor Speech  Assessment/Intervention    Motor Speech Function WFL  -           Cognitive Assessment Intervention- SLP    Orientation Status (Cognition) awareness of basic personal information;person;place;time;situation;[facility]  -        Attention (Cognitive) selective;sustained;WFL  -           SLP Evaluation Clinical Impressions    SLP Diagnosis functional speech/language skills  -        SLP Diagnosis Comments Speech and language abilties are grossly functional @ simple level. U/a to fully assess cognitive linguistic abilties 2' pt recieving care from other provider. SLP will f/u for dx tx as appropriate  -        Rehab Potential/Prognosis good  -[facility] Criteria for Skilled Therapy Interventions Met yes  -           Recommendations    Therapy Frequency (SLP SLC) 3 days per week  -        Predicted Duration Therapy Intervention (Days) 1 week  -                  User Key  (r) = Recorded By, (t) = Taken By, (c) = Cosigned By      Initials Name Effective Dates    Niya Thomason MS CCC-SLP 05/12/23 -                        EDUCATION  The patient has been educated in the following areas:     Communication Impairment.           SLP GOALS       Row Name 01/21/25 0940             SLP Diagnostic Treatment     Patient will participate in further assessment in the following areas reading comprehension;graphic expression;cognitive-linguistic  -      Time Frame (Diagnostic) 1 week  -      Progress/Outcomes (Additional Goal 1, SLP) new goal  -                User Key  (r) = Recorded By, (t) = Taken By, (c) = Cosigned By      Initials Name Provider Type    Niya Thomason MS CCC-SLP Speech and Language Pathologist                              Time Calculation:      Time Calculation- SLP       Row Name 01/21/25 1130             Time Calculation- SLP    SLP Start Time 0940  -      SLP Received On 01/21/25  -         Untimed Charges    39597-[id number] Eval Speech and Production w/ Language Minutes 20  -       [id number] Eval Oral Pharyng Swallow Minutes 40  -MH         Total Minutes    Untimed Charges Total Minutes 60  -MH       Total Minutes 60  -MH                User Key  (r) = Recorded By, (t) = Taken By, (c) = Cosigned By      Initials Name Provider Type     Niya Eli, MS CCC-SLP Speech and Language Pathologist                    Therapy Charges for Today       Code Description Service Date Service Provider Modifiers Qty    01682664049 HC ST EVAL ORAL PHARYNG SWALLOW 3 1/21/2025 Niya Eli, MS CCC-SLP GN 1    [id number] HC ST EVAL SPEECH AND PROD W LANG  1 1/21/2025 Niya Eli, MS SNELL-SLP GN 1                       MS EVANGELIST Brewer  1/21/2025

## 2025-01-21 NOTE — PLAN OF CARE
Goal Outcome Evaluation:                   Anticipated Discharge Disposition (SLP): unknown, other (see comments) (pending further w/u)

## 2025-01-21 NOTE — PROGRESS NOTES
[facility] Medicine Services  PROGRESS NOTE    Patient Name: Jorgito Luis  : 1965  MRN: [id number]    Date of Admission: 2025  Primary Care Physician: Meseret Martinez APRN    Subjective   Subjective     CC: Follow-up left upper extremity numbness and weakness    HPI: No acute events overnight, patient rested well, feels much better this morning    Objective   Objective     Vital Signs:   Temp:  [97.4 °F (36.3 °C)-98 °F (36.7 °C)] 97.8 °F (36.6 °C)  Heart Rate:  [74-79] 76  Resp:  [18] 18  BP: (150-181)/() 150/96     Physical Exam:  Constitutional: No acute distress, awake, alert  HENT: NCAT, mucous membranes moist  Respiratory: Clear to auscultation bilaterally, respiratory effort normal   Cardiovascular: RRR, no murmurs, rubs, or gallops  Gastrointestinal: Positive bowel sounds, soft, nontender, nondistended  Musculoskeletal: No bilateral ankle edema  Psychiatric: Appropriate affect, cooperative  Neurologic: Oriented x 3, strength symmetric in all extremities, Cranial Nerves grossly intact to confrontation, speech clear  Skin: No rashes      Results Reviewed:  LAB RESULTS:      Lab 25  1557 25  1554   WBC  --  8.00   HEMOGLOBIN  --  16.1   HEMOGLOBIN, POC 16.7  --    HEMATOCRIT  --  47.1   HEMATOCRIT POC 49  --    PLATELETS  --  315   NEUTROS ABS  --  5.74   IMMATURE GRANS (ABS)  --  0.03   LYMPHS ABS  --  1.45   MONOS ABS  --  0.53   EOS ABS  --  0.17   MCV  --  88.0   PROTIME  --  12.7   APTT  --  30.0         Lab 25  1557   CREATININE 0.80   EGFR 102.6             Lab 25  1554   PROTIME 12.7   INR 0.95                 Brief Urine Lab Results       None            Microbiology Results Abnormal       None            MRI Brain With & Without Contrast    Result Date: 2025  MRI BRAIN W WO CONTRAST, MRI THORACIC SPINE W WO CONTRAST, MRI LUMBAR SPINE W WO CONTRAST Date of Exam: 2025 4:10 AM EST Indication: MS protocol  Comparison:  Noncontrast brain MRI 1 day prior Technique:  Routine multiplanar/multisequence sequence images of the brain, thoracic and lumbar spine were obtained before and after the uneventful administration of 18 mL Multihance. Findings: MRI brain: The addition of postcontrast as well as MS protocol thin cut sequences redemonstrates numerous bilateral asymmetric areas of white matter T2 hyperintensity, primarily subcortical with several juxtacortical lesions also present. The ringlike finding involving the right aspect of the corpus callosum demonstrates some central volume loss, without convincing enhancement. There is however some faint enhancement associated with areas of cortical diffusion restriction noted on noncontrast exam, best appreciated on image 141 and 147 of series 8. No additional abnormal enhancement is evident. Findings are otherwise unchanged from same day noncontrast comparison. MRI thoracic spine: T1 marrow signal is preserved, without evidence of fracture or suspicious marrow replacing lesion. There is mild exaggeration of the usual upper thoracic kyphosis, otherwise without listhesis or subluxation. The thoracic spinal cord demonstrates no convincing focal areas of intramedullary signal abnormality. There is no abnormal enhancement. The paraspinal soft tissues demonstrate no acute or suspicious findings. Mild multilevel thoracic spondylosis is evident with some small osteophytes and disc space height loss. There is no significant associated spinal canal or neuroforaminal narrowing. MRI lumbar spine: Marrow signal is preserved, without evidence of fracture or suspicious marrow replacing lesion. Alignment remains anatomic, without evidence of listhesis or subluxation. The conus medullaris and cauda equina nerve roots appear satisfactory. The paraspinal soft tissues demonstrate no acute or suspicious findings. There is no abnormal enhancement. Mild lumbar spondylosis is present with some very small areas of  disc bulge and small osteophytes. The spinal canal and neural foramina remain widely patent.     Impression: Impression: The addition of postcontrast sequences demonstrates no enhancement associated with the diffusion restricting ringlike area of T2 hyperintensity seen within the corpus callosum on noncontrast exam. The appearance of this lesion remains most concerning for  an area of potential subacute demyelination. Additional tiny areas of diffusion restriction seen along cortical margins of the right pre and postcentral gyrus demonstrate corresponding enhancement, favoring areas of acute infarct. While neoplasm is considered significantly less likely, follow-up contrast-enhanced MRI could be useful to ensure expected evolution of presumed small areas of infarct. No evidence of demyelinating process or other focal signal abnormality of the thoracic spinal cord, conus medullaris and cauda equina nerve roots. Minimal thoracic and lumbar spondylosis is present, without associated areas of significant spinal canal or  neuroforaminal narrowing. Electronically Signed: Alfred Choudhury MD  1/21/2025 5:47 AM EST  Workstation ID: [id number]    MRI Lumbar Spine With & Without Contrast    Result Date: 1/21/2025  MRI BRAIN W WO CONTRAST, MRI THORACIC SPINE W WO CONTRAST, MRI LUMBAR SPINE W WO CONTRAST Date of Exam: 1/21/2025 4:10 AM EST Indication: MS protocol  Comparison: Noncontrast brain MRI 1 day prior Technique:  Routine multiplanar/multisequence sequence images of the brain, thoracic and lumbar spine were obtained before and after the uneventful administration of 18 mL Multihance. Findings: MRI brain: The addition of postcontrast as well as MS protocol thin cut sequences redemonstrates numerous bilateral asymmetric areas of white matter T2 hyperintensity, primarily subcortical with several juxtacortical lesions also present. The ringlike finding involving the right aspect of the corpus callosum demonstrates some central  volume loss, without convincing enhancement. There is however some faint enhancement associated with areas of cortical diffusion restriction noted on noncontrast exam, best appreciated on image 141 and 147 of series 8. No additional abnormal enhancement is evident. Findings are otherwise unchanged from same day noncontrast comparison. MRI thoracic spine: T1 marrow signal is preserved, without evidence of fracture or suspicious marrow replacing lesion. There is mild exaggeration of the usual upper thoracic kyphosis, otherwise without listhesis or subluxation. The thoracic spinal cord demonstrates no convincing focal areas of intramedullary signal abnormality. There is no abnormal enhancement. The paraspinal soft tissues demonstrate no acute or suspicious findings. Mild multilevel thoracic spondylosis is evident with some small osteophytes and disc space height loss. There is no significant associated spinal canal or neuroforaminal narrowing. MRI lumbar spine: Marrow signal is preserved, without evidence of fracture or suspicious marrow replacing lesion. Alignment remains anatomic, without evidence of listhesis or subluxation. The conus medullaris and cauda equina nerve roots appear satisfactory. The paraspinal soft tissues demonstrate no acute or suspicious findings. There is no abnormal enhancement. Mild lumbar spondylosis is present with some very small areas of disc bulge and small osteophytes. The spinal canal and neural foramina remain widely patent.     Impression: Impression: The addition of postcontrast sequences demonstrates no enhancement associated with the diffusion restricting ringlike area of T2 hyperintensity seen within the corpus callosum on noncontrast exam. The appearance of this lesion remains most concerning for  an area of potential subacute demyelination. Additional tiny areas of diffusion restriction seen along cortical margins of the right pre and postcentral gyrus demonstrate corresponding  enhancement, favoring areas of acute infarct. While neoplasm is considered significantly less likely, follow-up contrast-enhanced MRI could be useful to ensure expected evolution of presumed small areas of infarct. No evidence of demyelinating process or other focal signal abnormality of the thoracic spinal cord, conus medullaris and cauda equina nerve roots. Minimal thoracic and lumbar spondylosis is present, without associated areas of significant spinal canal or  neuroforaminal narrowing. Electronically Signed: Alfred Choudhury MD  1/21/2025 5:47 AM EST  Workstation ID: [id number]    MRI Thoracic Spine With & Without Contrast    Result Date: 1/21/2025  MRI BRAIN W WO CONTRAST, MRI THORACIC SPINE W WO CONTRAST, MRI LUMBAR SPINE W WO CONTRAST Date of Exam: 1/21/2025 4:10 AM EST Indication: MS protocol  Comparison: Noncontrast brain MRI 1 day prior Technique:  Routine multiplanar/multisequence sequence images of the brain, thoracic and lumbar spine were obtained before and after the uneventful administration of 18 mL Multihance. Findings: MRI brain: The addition of postcontrast as well as MS protocol thin cut sequences redemonstrates numerous bilateral asymmetric areas of white matter T2 hyperintensity, primarily subcortical with several juxtacortical lesions also present. The ringlike finding involving the right aspect of the corpus callosum demonstrates some central volume loss, without convincing enhancement. There is however some faint enhancement associated with areas of cortical diffusion restriction noted on noncontrast exam, best appreciated on image 141 and 147 of series 8. No additional abnormal enhancement is evident. Findings are otherwise unchanged from same day noncontrast comparison. MRI thoracic spine: T1 marrow signal is preserved, without evidence of fracture or suspicious marrow replacing lesion. There is mild exaggeration of the usual upper thoracic kyphosis, otherwise without listhesis or  subluxation. The thoracic spinal cord demonstrates no convincing focal areas of intramedullary signal abnormality. There is no abnormal enhancement. The paraspinal soft tissues demonstrate no acute or suspicious findings. Mild multilevel thoracic spondylosis is evident with some small osteophytes and disc space height loss. There is no significant associated spinal canal or neuroforaminal narrowing. MRI lumbar spine: Marrow signal is preserved, without evidence of fracture or suspicious marrow replacing lesion. Alignment remains anatomic, without evidence of listhesis or subluxation. The conus medullaris and cauda equina nerve roots appear satisfactory. The paraspinal soft tissues demonstrate no acute or suspicious findings. There is no abnormal enhancement. Mild lumbar spondylosis is present with some very small areas of disc bulge and small osteophytes. The spinal canal and neural foramina remain widely patent.     Impression: Impression: The addition of postcontrast sequences demonstrates no enhancement associated with the diffusion restricting ringlike area of T2 hyperintensity seen within the corpus callosum on noncontrast exam. The appearance of this lesion remains most concerning for  an area of potential subacute demyelination. Additional tiny areas of diffusion restriction seen along cortical margins of the right pre and postcentral gyrus demonstrate corresponding enhancement, favoring areas of acute infarct. While neoplasm is considered significantly less likely, follow-up contrast-enhanced MRI could be useful to ensure expected evolution of presumed small areas of infarct. No evidence of demyelinating process or other focal signal abnormality of the thoracic spinal cord, conus medullaris and cauda equina nerve roots. Minimal thoracic and lumbar spondylosis is present, without associated areas of significant spinal canal or  neuroforaminal narrowing. Electronically Signed: Alfred Choudhury MD  1/21/2025 5:47  AM EST  Workstation ID: [id number]    MRI Brain Without Contrast    Addendum Date: 1/20/2025    ADDENDUM #1 Tiny foci of restricted diffusion with ADC signal dropout noted involving the cortical gray matter of the left frontal lobe, the right parietal lobe, and with hazy involvement of the right frontal lobe. These are concerning for tiny infarcts as well. A voice recognition error is present in the impression of the ringlike structure. The examination was performed without contrast and therefore it represents a ringlike area of increased T2 or FLAIR signal and not ring enhancement. Electronically Signed: Genaro Fish MD  1/20/2025 9:50 PM EST  Workstation ID: [id number] ORIGINAL REPORT: MRI BRAIN WO CONTRAST Date of Exam: 1/20/2025 4:50 PM EST Indication: left sided weakness.  Comparison: None available. Technique:  Routine multiplanar/multisequence sequence images of the brain were obtained without contrast administration. Findings: Ringlike areas of questionable restricted diffusion versus T2 shine through and ADC signal dropout involving the right corpus callosum seen on FLAIR image #17 series 6. No associated blood products. Multifocal areas of T2/FLAIR signal increase are noted within the subcortical, deep cerebral, and periventricular white matter consistent with chronic small vessel/microangiopathic ischemic changes. The ventricles and sulci are normal in size and configuration. . No extra-axial mass or collection. The posterior fossa is normal. Sellar and suprasellar structures are normal. The major intracranial flow-voids of the [place] are patent. Orbital and periorbital soft tissues are normal. The visualized paranasal sinuses and ethmoid air cells are aerated. The mastoid air cells are aerated.. 2.8 cm x 0.7 cm fatty lesion arising from the outer table of the skull consistent with a scalp lipoma. Impression: Ring-enhancing area of possible restricted diffusion versus T2 shine through on ADC signal  changes involving the right corpus callosum. This may represent a small infarct. Consider an MRI of the brain with contrast to exclude demyelinating disease or much less likely an underlying mass. Electronically Signed: Genaro Fish MD  1/20/2025 5:34 PM EST  Workstation ID: [id number]    Result Date: 1/20/2025  MRI BRAIN WO CONTRAST Date of Exam: 1/20/2025 4:50 PM EST Indication: left sided weakness.  Comparison: None available. Technique:  Routine multiplanar/multisequence sequence images of the brain were obtained without contrast administration. Findings: Ringlike areas of questionable restricted diffusion versus T2 shine through and ADC signal dropout involving the right corpus callosum seen on FLAIR image #17 series 6. No associated blood products. Multifocal areas of T2/FLAIR signal increase are noted within the subcortical, deep cerebral, and periventricular white matter consistent with chronic small vessel/microangiopathic ischemic changes. The ventricles and sulci are normal in size and configuration. . No extra-axial mass or collection. The posterior fossa is normal. Sellar and suprasellar structures are normal. The major intracranial flow-voids of the [place] are patent. Orbital and periorbital soft tissues are normal. The visualized paranasal sinuses and ethmoid air cells are aerated. The mastoid air cells are aerated.. 2.8 cm x 0.7 cm fatty lesion arising from the outer table of the skull consistent with a scalp lipoma.     Impression: Impression: Ring-enhancing area of possible restricted diffusion versus T2 shine through on ADC signal changes involving the right corpus callosum. This may represent a small infarct. Consider an MRI of the brain with contrast to exclude demyelinating disease or much less likely an underlying mass. Electronically Signed: Genaro Fish MD  1/20/2025 5:34 PM EST  Workstation ID: [id number]    CT CEREBRAL PERFUSION WITH & WITHOUT CONTRAST    Result Date: 1/20/2025  CT  CEREBRAL PERFUSION W WO CONTRAST, CT ANGIOGRAM NECK, CT ANGIOGRAM HEAD Date of Exam: 1/20/2025 6:00 PM EST Indication: Left arm and leg weakness.  Comparison: None available. Technique: Axial CT images of the brain were obtained prior to and after the administration of 115 cc Isovue-370 IV contrast . Core blood volume, core blood flow, mean transit time, and Tmax images were obtained utilizing the Rapid software protocol. A limited CT angiogram of the head was also performed to measure the blood vessel density. The radiation dose reduction device was turned on for each scan per the ALARA (As Low as Reasonably Achievable) protocol. CTA of the head and neck was performed after the uneventful intravenous administration of iodinated contrast.  Reconstructed coronal and sagittal images were also obtained. In addition, a 3-D volume rendered image was created for interpretation. Automated exposure control and iterative reconstruction methods were used.  FINDINGS: CT perfusion there is no elevated Tmax or decreased cerebral blood flow or blood volume to indicate an infarct. Mild atheromatous disease of the aortic arch the right common carotid artery is normal. Calcified and noncalcified mural thrombus involving the proximal right internal carotid artery with a residual vessel luminal diameter of 1-to 2 mm with a native vessel luminal diameter of right 0.5 cm consistent with between 60 and 80% stenosis per NASCET criteria. Moderate atheromatous disease involving the intracranial segments of the right internal carotid artery. The right carotid terminus is normal. The visualized branches of the right anterior and middle cerebral arteries are normal. The left common carotid artery is normal. Mild atheromatous disease of the left carotid bulb. Noncalcified atheromatous narrowing of the proximal left internal carotid artery with a residual vessel luminal diameter of 3 mm and a native vessel luminal diameter of 5 mm consistent  with proximally 40% stenosis per NASCET criteria. Otherwise the extracranial left internal carotid artery is normal. Moderate atheromatous disease involving intracranial segments of the left internal carotid artery. The left carotid terminus is normal. The left anterior cerebral artery arises from the right anterior circulation via a prominent right posterior communicating artery. The visualized branches of the left middle cerebral artery are normal. Both vertebral arteries arise from the subclavian arteries. Both vertebral arteries supply the basilar artery. The basilar artery and basilar artery tip are normal. The basilar artery terminates in bilateral posterior cerebral arteries which appear normal. The intracranial venous sinuses are patent. Orbital and peripheral soft tissues are normal. Mucosal thickening of the maxillary sinuses. Secretions within the left maxillary sinus. The mastoid air cells are aerated. The parotid and submandibular glands are normal. The airway is clear. The thyroid gland is normal. The lung apices are clear.     Impression: There is between 60 and 80% narrowing of the proximal right internal carotid artery. No vessel occlusion is seen. No evidence of infarct on CT perfusion. Electronically Signed: Genaro Fish MD  1/20/2025 6:34 PM EST  Workstation ID: [id number]    CT Angiogram Head    Result Date: 1/20/2025  CT CEREBRAL PERFUSION W WO CONTRAST, CT ANGIOGRAM NECK, CT ANGIOGRAM HEAD Date of Exam: 1/20/2025 6:00 PM EST Indication: Left arm and leg weakness.  Comparison: None available. Technique: Axial CT images of the brain were obtained prior to and after the administration of 115 cc Isovue-370 IV contrast . Core blood volume, core blood flow, mean transit time, and Tmax images were obtained utilizing the Rapid software protocol. A limited CT angiogram of the head was also performed to measure the blood vessel density. The radiation dose reduction device was turned on for each scan per  the ALARA (As Low as Reasonably Achievable) protocol. CTA of the head and neck was performed after the uneventful intravenous administration of iodinated contrast.  Reconstructed coronal and sagittal images were also obtained. In addition, a 3-D volume rendered image was created for interpretation. Automated exposure control and iterative reconstruction methods were used.  FINDINGS: CT perfusion there is no elevated Tmax or decreased cerebral blood flow or blood volume to indicate an infarct. Mild atheromatous disease of the aortic arch the right common carotid artery is normal. Calcified and noncalcified mural thrombus involving the proximal right internal carotid artery with a residual vessel luminal diameter of 1-to 2 mm with a native vessel luminal diameter of right 0.5 cm consistent with between 60 and 80% stenosis per NASCET criteria. Moderate atheromatous disease involving the intracranial segments of the right internal carotid artery. The right carotid terminus is normal. The visualized branches of the right anterior and middle cerebral arteries are normal. The left common carotid artery is normal. Mild atheromatous disease of the left carotid bulb. Noncalcified atheromatous narrowing of the proximal left internal carotid artery with a residual vessel luminal diameter of 3 mm and a native vessel luminal diameter of 5 mm consistent with proximally 40% stenosis per NASCET criteria. Otherwise the extracranial left internal carotid artery is normal. Moderate atheromatous disease involving intracranial segments of the left internal carotid artery. The left carotid terminus is normal. The left anterior cerebral artery arises from the right anterior circulation via a prominent right posterior communicating artery. The visualized branches of the left middle cerebral artery are normal. Both vertebral arteries arise from the subclavian arteries. Both vertebral arteries supply the basilar artery. The basilar artery and  basilar artery tip are normal. The basilar artery terminates in bilateral posterior cerebral arteries which appear normal. The intracranial venous sinuses are patent. Orbital and peripheral soft tissues are normal. Mucosal thickening of the maxillary sinuses. Secretions within the left maxillary sinus. The mastoid air cells are aerated. The parotid and submandibular glands are normal. The airway is clear. The thyroid gland is normal. The lung apices are clear.     Impression: There is between 60 and 80% narrowing of the proximal right internal carotid artery. No vessel occlusion is seen. No evidence of infarct on CT perfusion. Electronically Signed: Genaro Fish MD  1/20/2025 6:34 PM EST  Workstation ID: [id number]    CT Angiogram Neck    Result Date: 1/20/2025  CT CEREBRAL PERFUSION W WO CONTRAST, CT ANGIOGRAM NECK, CT ANGIOGRAM HEAD Date of Exam: 1/20/2025 6:00 PM EST Indication: Left arm and leg weakness.  Comparison: None available. Technique: Axial CT images of the brain were obtained prior to and after the administration of 115 cc Isovue-370 IV contrast . Core blood volume, core blood flow, mean transit time, and Tmax images were obtained utilizing the Rapid software protocol. A limited CT angiogram of the head was also performed to measure the blood vessel density. The radiation dose reduction device was turned on for each scan per the ALARA (As Low as Reasonably Achievable) protocol. CTA of the head and neck was performed after the uneventful intravenous administration of iodinated contrast.  Reconstructed coronal and sagittal images were also obtained. In addition, a 3-D volume rendered image was created for interpretation. Automated exposure control and iterative reconstruction methods were used.  FINDINGS: CT perfusion there is no elevated Tmax or decreased cerebral blood flow or blood volume to indicate an infarct. Mild atheromatous disease of the aortic arch the right common carotid artery is normal.  Calcified and noncalcified mural thrombus involving the proximal right internal carotid artery with a residual vessel luminal diameter of 1-to 2 mm with a native vessel luminal diameter of right 0.5 cm consistent with between 60 and 80% stenosis per NASCET criteria. Moderate atheromatous disease involving the intracranial segments of the right internal carotid artery. The right carotid terminus is normal. The visualized branches of the right anterior and middle cerebral arteries are normal. The left common carotid artery is normal. Mild atheromatous disease of the left carotid bulb. Noncalcified atheromatous narrowing of the proximal left internal carotid artery with a residual vessel luminal diameter of 3 mm and a native vessel luminal diameter of 5 mm consistent with proximally 40% stenosis per NASCET criteria. Otherwise the extracranial left internal carotid artery is normal. Moderate atheromatous disease involving intracranial segments of the left internal carotid artery. The left carotid terminus is normal. The left anterior cerebral artery arises from the right anterior circulation via a prominent right posterior communicating artery. The visualized branches of the left middle cerebral artery are normal. Both vertebral arteries arise from the subclavian arteries. Both vertebral arteries supply the basilar artery. The basilar artery and basilar artery tip are normal. The basilar artery terminates in bilateral posterior cerebral arteries which appear normal. The intracranial venous sinuses are patent. Orbital and peripheral soft tissues are normal. Mucosal thickening of the maxillary sinuses. Secretions within the left maxillary sinus. The mastoid air cells are aerated. The parotid and submandibular glands are normal. The airway is clear. The thyroid gland is normal. The lung apices are clear.     Impression: There is between 60 and 80% narrowing of the proximal right internal carotid artery. No vessel occlusion  is seen. No evidence of infarct on CT perfusion. Electronically Signed: Genaro Fish MD  1/20/2025 6:34 PM EST  Workstation ID: [id number]    CT Head Without Contrast    Result Date: 1/20/2025  CT HEAD WO CONTRAST Date of Exam: 1/20/2025 6:00 PM EST Indication: left arm and leg weakness. Comparison: None available. Technique: Axial CT images were obtained of the head without contrast administration.  Automated exposure control and iterative construction methods were used. Findings: There is no evidence of acute infarct or hemorrhage. No abnormal extra-axial fluid collections are identified. There is no mass effect or hydrocephalus. Globes and orbits are within normal limits. There is a polyp or mucous retention cyst within the left maxillary sinus.     Impression: Impression: 1. No acute intracranial abnormality. Findings called to [facility] with stroke team at 6:28 p.m. on 1/20/2025 Electronically Signed: Morro Brantley MD  1/20/2025 6:28 PM EST  Workstation ID: [id number]    MRI Cervical Spine Without Contrast    Result Date: 1/20/2025  MRI CERVICAL SPINE WO CONTRAST Date of Exam: 1/20/2025 4:50 PM EST Indication: left arm weakness, paresthesia.  Comparison: None available. Technique:  Routine multiplanar/multisequence sequence images of the cervical spine were obtained without contrast administration.  Findings: There is normal height, alignment, and signal intensity of the cervical vertebral bodies. There is normal signal within the substance of the spinal cord. The craniovertebral junction appears normal. There is disc desiccation and disc base narrowing throughout the cervical spine. Axial images demonstrate: C2/3: No significant disc bulge. Facet joints are within normal limits. No spinal canal stenosis or neural foraminal narrowing C3/4: No significant disc bulge. Facet joints are within normal limits. No spinal canal stenosis or neural foraminal narrowing C4/5: No significant disc bulge. There is bilateral  uncovertebral joint spurring degenerative facet change resulting in mild bilateral neural foraminal narrowing right greater than left. C5/6: Minimal posterior disc bulge. There is mild uncovertebral joint spurring and degenerative facet change resulting in mild bilateral neural foraminal narrowing. C6/7: No significant disc bulge. There are mild degenerative facet changes bilaterally. No spinal canal stenosis or neural foraminal narrowing. C7/T1: Small right paracentral disc bulge. No significant canal stenosis. No neural foraminal narrowing. Facet joints are within normal limits.     Impression: Impression: 1. Minimal degenerative disc disease and degenerative facet change resulting in mild multilevel neural foraminal narrowing. No spinal canal stenosis. 2. Normal signal seen throughout the substance of the visualized spinal cord. Electronically Signed: Morro Brantley MD  1/20/2025 6:04 PM EST  Workstation ID: [id number]    XR Chest 1 View    Result Date: 1/20/2025  XR CHEST 1 VW Date of Exam: 1/20/2025 4:04 PM EST Indication: Acute Stroke Protocol (onset < 12 hrs) Comparison: 9/11/2023 Findings: The heart, mediastinum and pulmonary vasculature appear within normal limits. The lungs appear well expanded and clear except for thin linear scarring in the area of the lingula, which is stable. No edema, effusion or pneumothorax is seen. Bony structures appear to be intact.     Impression: Impression: No evidence of active chest disease. Electronically Signed: Desmond Edmondson MD  1/20/2025 4:26 PM EST  Workstation ID: [id number]         Current medications:  Scheduled Meds:ascorbic acid, 250 mg, Oral, Daily  aspirin, 81 mg, Oral, Daily   Or  aspirin, 300 mg, Rectal, Daily  atorvastatin, 80 mg, Oral, Nightly  busPIRone, 5 mg, Oral, Q12H  clopidogrel, 75 mg, Oral, Daily  sodium chloride, 10 mL, Intravenous, Q12H      Continuous Infusions:   PRN Meds:.  albuterol    sodium chloride    sodium chloride    sodium chloride    Assessment  & Plan   Assessment & Plan     Active Hospital Problems    Diagnosis  POA    **Left arm weakness [R29.898]  Yes    Hyperlipidemia [E78.5]  Yes    Essential hypertension [I10]  Yes    ARMAND (generalized anxiety disorder) [F41.1]  Yes    Stenosis of right carotid artery [I65.21]  Yes      Resolved Hospital Problems   No resolved problems to display.        Brief Hospital Course to date:  Jorgito Luis is a 59 y.o. male with history of hypertension, hyperlipidemia, general anxiety disorder who presented with left arm weakness found to have subacute CVA, right ICA stenosis    Acute versus subacute infarct seen on MRI  Right ICA stenosis   Hyperlipidemia  -Stroke neurology following, complete workup per protocol, follow-up MRIs with contrast   -Continue DAPT with aspirin and Plavix, patient intolerant to statins due to severe myalgias   -Follow-up echo, may need cardiac monitoring at discharge  -CTA shows 60 to 80% stenosis of right ICA, continue further workup, follow-up carotid ultrasound.  -PT/OT to follow     Hypertension  -Liberalize BP control    General anxiety disorder/PTSD    Expected Discharge Location and Transportation: TBD  Expected Discharge   Expected Discharge Date: 1/22/2025; Expected Discharge Time:      VTE Prophylaxis:  Mechanical VTE prophylaxis orders are present.       AM-PAC 6 Clicks Score (PT): 23 (01/20/25 0270)    CODE STATUS:   There are no questions and answers to display.       Tobi Fitzgerald MD  01/21/25

## 2025-01-21 NOTE — PROGRESS NOTES
Normal neurology referral discussed with Dr. Caldwell  of General Neurology who recommends outpatient follow-up for possible demyelinating process with Dr. Ramos.  Patient currently refusing further inpatient workup including lumbar puncture per Dr. Carter.  Orders placed.  He is okay to DC from a neurology perspective when okay with primary team.    DUGLAS Draper, [facility]-BC

## 2025-01-21 NOTE — CONSULTS
Stroke Consult Note    Patient Name: Jorgito Luis   MRN: [id number]  Age: 59 y.o.  Sex: male  : 1965    Primary Care Physician: Meseret Martinez APRN  Referring Physician: Ramses YANCEY    TIME STROKE TEAM CALLED:  EST     TIME PATIENT SEEN: 1930 EST    Handedness: Right  Race:     Chief Complaint/Reason for Consultation: Left upper extremity numbness and heaviness and decreased fine motor function to the left hand    HPI:   Mr. Jorgito Luis is a 59-year-old white male, right-handed with significant health diagnosis for hypertension, hyperlipidemia, history of left foot injury work-related, GERD, sleep apnea not on CPAP who presents to [facility] ED accompanied by his wife for evaluation of persistent left upper extremity heaviness, numbness, decreased fine motor function to the left hand that has been going on over the last 2 weeks on and off with persistence of symptoms today that prompted patient for further evaluation at the ED.  Stroke neurology was consulted at the ED for patient's evaluation after MRI noted to have multifocal cardioembolic infarct bilateral hemisphere as well as ring enhancement at the right corpus callosum that resembled possible MS versus microvascular disease.  Upon patient's exam patient is resting in bed alert and oriented follow commands not in acute distress.  Patient reports numbness and tingling to his left hand with decreased motor function that has been going on of on for 2 weeks usually episodes last for about 5 to 10 minutes however today patient had persistent symptoms, with improvement of symptoms upon his arrival to the ED.  Otherwise no focal neurological symptoms.  No aphasia no dysphagia or dysarthria.  Vision intact in all fields, no nystagmus, no gaze preference.  No neglect, no focal weakness per my exam no drift there is mild decreased handgrip to left hand.  Numbness and heaviness to left hand however improved.  Patient reports noncompliance with blood  "pressure medication takes it only when his blood pressure is high.  Patient reports myalgia to statin many years ago that prompted him not to take statin.  Discussed adherence to medical management to prevent complications, secondary stroke prevention.  Patient uses a cane for ambulation for gait stability as patient has major injury work related the cost amputation of his big toe left leg.  Require some minimal assistance from his wife otherwise he is independent with all ADL.  Patient denies any tobacco, alcohol, illicit drug or marijuana use.    Last Known Normal Date/Time: 2 weeks ago EST     Review of Systems   Constitutional: Negative.    HENT: Negative.     Eyes: Negative.    Respiratory: Negative.     Cardiovascular: Negative.    Gastrointestinal: Negative.    Endocrine: Negative.    Genitourinary: Negative.    Musculoskeletal:  Positive for gait problem.   Skin: Negative.    Neurological:  Positive for weakness and numbness.   Hematological: Negative.    Psychiatric/Behavioral: Negative.        Past Medical History:   Diagnosis Date    Arthritis     Cancer 2014    colon-rectum    Difficulty in walking     GERD (gastroesophageal reflux disease)     history of    Hearing loss     Hypertension     states he takes his meds \"as needed\" based on blood pressures - 5/20/24    Memory loss     Sleep apnea     does not use CPAP     Past Surgical History:   Procedure Laterality Date    ABDOMINAL SURGERY  2014    colon cancer removed rectum - [facility], [place], operated by [facility]    ENDOSCOPY N/A 04/05/2024    Procedure: ESOPHAGOGASTRODUODENOSCOPY WITH FORIEGN BODY REMOVAL;  Surgeon: Jacki Perera MD;  Location: [facility] ENDOSCOPY;  Service: Gastroenterology;  Laterality: N/A;    ILEOSTOMY CLOSURE  06/2015    [place]    KNEE ARTHROSCOPY Left     meniscus    LOW ANTERIOR BOWEL RESECTION  04/2015    with ileostomy; [place] - see care everywhere    TOE AMPUTATION Left 01/2024     Family History   Problem Relation Age of Onset    " Hypertension Mother     Arthritis Mother     Cancer Mother     Diabetes Mother     Heart disease Mother     Hyperlipidemia Mother     Malig Hypertension Mother     Hypertension Father     Arthritis Father     Alcohol abuse Father     Diabetes Father     Heart disease Father     Malig Hypertension Father     Obesity Father      Social History     Socioeconomic History    Marital status:    Tobacco Use    Smoking status: Never    Smokeless tobacco: Never   Vaping Use    Vaping status: Never Used   Substance and Sexual Activity    Alcohol use: Never    Drug use: Never    Sexual activity: Defer     Allergies   Allergen Reactions    Vancomycin Itching     Has to be given benadryl before he can take vancomycin    Lansoprazole Other (See Comments) and Rash     Mouth blisters    Statins Myalgia     Hurt his legs when he took them     Prior to Admission medications    Medication Sig Start Date End Date Taking? Authorizing Provider   acetaminophen (TYLENOL) 160 MG/5ML liquid Take  by mouth As Needed.    Erika Palm MD   albuterol (PROVENTIL) (2.5 MG/3ML) 0.083% nebulizer solution USE 3 ML VIA NEBULIZER THREE TIMES DAILY AS NEEDED 1/19/22   Erika Palm MD   ascorbic acid (VITAMIN C) 250 MG tablet Take 1 tablet by mouth Daily.    Erika Palm MD   busPIRone (BUSPAR) 5 MG tablet Take 1 tablet by mouth. 8/26/24   Erika Palm MD   CBD (cannabidiol) oral oil Take  by mouth As Needed.    Erika Palm MD   Cholecalciferol 25 MCG (1000 UT) tablet Take 1 tablet by mouth Daily.    Erika Palm MD   Hydrocortisone Max St 1 % cream Apply  topically to the appropriate area as directed As Needed. 11/19/21   Erika Palm MD   hydrOXYzine (ATARAX) 10 MG tablet Take 1-2 tablets by mouth 3 (Three) Times a Day As Needed for Anxiety. 8/15/24   Erika Palm MD   ibuprofen (ADVIL,MOTRIN) 100 MG/5ML suspension Every 6 (Six) Hours As Needed.    Erika Palm MD    ivermectin (STROMECTOL) 3 MG tablet tablet As Needed. 2/28/22   Erika Palm MD   L-THEANINE PO Take  by mouth Daily.    Erika Palm MD   Lidoderm 5 % Place 1 patch on the skin as directed by provider As Needed. 2/7/22   Erika Palm MD   lisinopril (PRINIVIL,ZESTRIL) 10 MG tablet Take 1 tablet by mouth As Needed (states he takes as needed based on blood pressures). 1/19/22   Erika Palm MD   losartan (COZAAR) 25 MG tablet Take 1 tablet by mouth As Needed (pt states as needed based on blood pressure).    Erika Palm MD   Magnesium 400 MG tablet Take 400 mg by mouth Daily.    Erika Palm MD   Melatonin-Pyridoxine (MELATONIN CR PO) Take  by mouth At Night As Needed.    Erika Palm MD   polyethylene glycol (MIRALAX) 17 GM/SCOOP powder Take 17 g by mouth Daily As Needed (constipation). Follow directions given at office 10/1/24   Trisha Capps PA-C   psyllium (METAMUCIL) 58.6 % powder Take  by mouth 2 (Two) Times a Day. 10/1/24   Trisha Capps PA-C   sucralfate (CARAFATE) 1 g tablet Take 1 tablet by mouth Daily As Needed.    Erika Palm MD   Turmeric 500 MG capsule Take 1 capsule by mouth Daily.    Erika Palm MD         Temp:  [98 °F (36.7 °C)] 98 °F (36.7 °C)  Heart Rate:  [74-79] 79  Resp:  [18] 18  BP: (154-181)/(106-128) 154/107  Neurological Exam  Mental Status  Alert. Oriented to person, place and time. Oriented to person, place, and time. Recent and remote memory are intact. Speech is normal. Language is fluent with no aphasia. Attention and concentration are normal.    Cranial Nerves  CN II: Right visual acuity: Normal. Left visual acuity: Normal. Right normal visual field. Left normal visual field.  CN III, IV, VI: Extraocular movements intact bilaterally. Normal lids and orbits bilaterally. Pupils equal round and reactive to light bilaterally.  CN V: Facial sensation is normal.  CN VII: Full and symmetric facial  movement.  CN VIII: Intact hearing bilateral.  CN IX, X: Palate elevates symmetrically  CN XI: Shoulder shrug strength is normal.  CN XII: Tongue midline without atrophy or fasciculations.    Motor  Normal muscle bulk throughout. No fasciculations present. Normal muscle tone. No abnormal involuntary movements. Strength is 5/5 throughout all four extremities.  Decreased left hand  and fine motor.    Sensory  Light touch abnormality: Sensation: Mild paresthesia to left hand and arm.  No right-sided hemispatial neglect. No left-sided hemispatial neglect. Right extinction absent: Left extinction absent:    Reflexes  Right Plantar: downgoing  Left Plantar: downgoing    Coordination  Right: Finger-to-nose normal. Heel-to-shin normal.Left: Finger-to-nose normal. Heel-to-shin normal.    Gait    Unable to assess.      Physical Exam  Constitutional:       Appearance: Normal appearance. He is obese.   HENT:      Head: Normocephalic and atraumatic.      Mouth/Throat:      Mouth: Mucous membranes are moist.   Eyes:      General: Lids are normal.      Extraocular Movements: Extraocular movements intact.      Pupils: Pupils are equal, round, and reactive to light.   Cardiovascular:      Rate and Rhythm: Normal rate.      Pulses: Normal pulses.   Pulmonary:      Effort: Pulmonary effort is normal.      Comments: Breathing comfortable on room air  Abdominal:      Tenderness: There is no abdominal tenderness.   Musculoskeletal:         General: Deformity present. Normal range of motion.      Cervical back: Normal range of motion and neck supple.   Neurological:      Mental Status: He is alert and oriented to person, place, and time.      Sensory: Sensory deficit present.      Motor: Motor strength is normal.Weakness present.   Psychiatric:         Mood and Affect: Mood normal.         Speech: Speech normal.         Behavior: Behavior normal.         Thought Content: Thought content normal.         Judgment: Judgment normal.          Acute Stroke Data    Thrombolytic Inclusion / Exclusion Criteria    Time: 21:07 EST  Person Administering Scale: DUGLAS Castañeda    Inclusion Criteria  [x]   18 years of age or greater   []   Onset of symptoms < 4.5 hours before beginning treatment (stroke onset = time patient was last seen well or without symptoms).   []   Diagnosis of acute ischemic stroke causing measurable disabling deficit (Complete Hemianopia, Any Aphasia, Visual or Sensory Extinction, Any weakness limiting sustained effort against gravity)   []   Any remaining deficit considered potentially disabling in view of patient and practitioner   Exclusion criteria (Do not proceed with Alteplase if any are checked under exclusion criteria)  [x]   Onset unknown or GREATER than 4.5 hours   []   ICH on CT/MRI   []   CT demonstrates hypodensity representing acute or subacute infarct   []   Significant head trauma or prior stroke in the previous 3 months   []   Symptoms suggestive of subarachnoid hemorrhage   []   History of un-ruptured intracranial aneurysm GREATER than 10 mm   []   Recent intracranial or intraspinal surgery within the last 3 months   []   Arterial puncture at a non-compressible site in the previous 7 days   []   Active internal bleeding   []   Acute bleeding tendency   []   Platelet count LESS than 100,000 for known hematological diseases such as leukemia, thrombocytopenia or chronic cirrhosis   []   Current use of anticoagulant with INR GREATER than 1.7 or PT GREATER than 15 seconds, aPTT GREATER than 40 seconds   []   Heparin received within 48 hours, resulting in abnormally elevated aPTT GREATER than upper limit of normal   []   Current use of direct thrombin inhibitors or direct factor Xa inhibitors in the past 48 hours   []   Elevated blood pressure refractory to treatment (systolic GREATER than 185 mm/Hg or diastolic  GREATER than 110 mm/Hg   []   Suspected infective endocarditis and aortic arch dissection   []    Current use of therapeutic treatment dose of low-molecular-weight heparin (LMWH) within the previous 24 hours   []   Structural GI malignancy or bleed   Relative exclusion for all patients  []   Only minor non-disabling symptoms   []   Pregnancy   []   Seizure at onset with postictal residual neurological impairments   []   Major surgery or previous trauma within past 14 days   []   History of previous spontaneous ICH, intracranial neoplasm, or AV malformation   []   Postpartum (within previous 14 days)   []   Recent GI or urinary tract hemorrhage (within previous 21 days)   []   Recent acute MI (within previous 3 months)   []   History of un-ruptured intracranial aneurysm LESS than 10 mm   []   History of ruptured intracranial aneurysm   []   Blood glucose LESS than 50 mg/dL (2.7 mmol/L)   []   Dural puncture within the last 7 days   []   Known GREATER than 10 cerebral microbleeds   Additional exclusions for patients with symptoms onset between 3 and 4.5 hours.  []   Age > 80.   []   On any anticoagulants regardless of INR  >>> Warfarin (Coumadin), Heparin, Enoxaparin (Lovenox), fondaparinux (Arixtra), bivalirudin (Angiomax), Argatroban, dabigatran (Pradaxa), rivaroxaban (Xarelto), or apixaban (Eliquis)   []   Severe stroke (NIHSS > 25).   []   History of BOTH diabetes and previous ischemic stroke.   []   The risks and benefits have been discussed with the patient or family related to the administration of IV thrombolytic therapy for stroke symptoms.   []   I have discussed and reviewed the patient's case and imaging with the attending prior to IV thrombolytic therapy.   na Time IV thrombolytic administered       Hospital Meds:  Scheduled- [START ON 1/21/2025] aspirin, 81 mg, Oral, Daily   Or  [START ON 1/21/2025] aspirin, 300 mg, Rectal, Daily  atorvastatin, 80 mg, Oral, Nightly  clopidogrel, 300 mg, Oral, Once   And  [START ON 1/21/2025] clopidogrel, 75 mg, Oral, Daily  sodium chloride, 10 mL, Intravenous,  Q12H      Infusions-     PRNs-   sodium chloride    sodium chloride    sodium chloride    Functional Status Prior to Current Stroke/[place] Score: 0-1    NIH Stroke Scale  Time: 21:07 EST  Person Administering Scale: DUGLAS Castañeda    Interval: baseline  1a. Level of Consciousness: 0-->Alert, keenly responsive  1b. LOC Questions: 0-->Answers both questions correctly  1c. LOC Commands: 0-->Performs both tasks correctly  2. Best Gaze: 0-->Normal  3. Visual: 0-->No visual loss  4. Facial Palsy: 0-->Normal symmetrical movements  5a. Motor Arm, Left: 0-->No drift, limb holds 90 (or 45) degrees for full 10 secs  5b. Motor Arm, Right: 0-->No drift, limb holds 90 (or 45) degrees for full 10 secs  6a. Motor Leg, Left: 0-->No drift, leg holds 30 degree position for full 5 secs  6b. Motor Leg, Right: 0-->No drift, leg holds 30 degree position for full 5 secs  7. Limb Ataxia: 0-->Absent  8. Sensory: 1-->Mild-to-moderate sensory loss, patient feels pinprick is less sharp or is dull on the affected side, or there is a loss of superficial pain with pinprick, but patient is aware of being touched  9. Best Language: 0-->No aphasia, normal  10. Dysarthria: 0-->Normal  11. Extinction and Inattention (formerly Neglect): 0-->No abnormality    Total (NIH Stroke Scale): 1       Results Reviewed:  I have personally reviewed current lab, radiology, and data and agree with results.  Sodium 138  Potassium 4.2  BUN 9  Creatinine 0.80  .6  Glucose 94  WBC 8  RBC 5.35  H&H 16.1\47.1  Platelets 350       CT CEREBRAL PERFUSION WITH & WITHOUT CONTRAST    Result Date: 1/20/2025  There is between 60 and 80% narrowing of the proximal right internal carotid artery. No vessel occlusion is seen. No evidence of infarct on CT perfusion. Electronically Signed: Genaro Fish MD  1/20/2025 6:34 PM EST  Workstation ID: [id number]    CT Angiogram Head    Result Date: 1/20/2025  There is between 60 and 80% narrowing of the proximal right internal  carotid artery. No vessel occlusion is seen. No evidence of infarct on CT perfusion. Electronically Signed: Genaro Fish MD  1/20/2025 6:34 PM EST  Workstation ID: [id number]    CT Angiogram Neck    Result Date: 1/20/2025  There is between 60 and 80% narrowing of the proximal right internal carotid artery. No vessel occlusion is seen. No evidence of infarct on CT perfusion. Electronically Signed: Genaro Fish MD  1/20/2025 6:34 PM EST  Workstation ID: [id number]    CT Head Without Contrast    Result Date: 1/20/2025  Impression: 1. No acute intracranial abnormality. Findings called to [facility] with stroke team at 6:28 p.m. on 1/20/2025 Electronically Signed: Morro Brantley MD  1/20/2025 6:28 PM EST  Workstation ID: [id number]    MRI Cervical Spine Without Contrast    Result Date: 1/20/2025  Impression: 1. Minimal degenerative disc disease and degenerative facet change resulting in mild multilevel neural foraminal narrowing. No spinal canal stenosis. 2. Normal signal seen throughout the substance of the visualized spinal cord. Electronically Signed: Morro Brantley MD  1/20/2025 6:04 PM EST  Workstation ID: [id number]    MRI Brain Without Contrast    Result Date: 1/20/2025  Impression: Ring-enhancing area of possible restricted diffusion versus T2 shine through on ADC signal changes involving the right corpus callosum. This may represent a small infarct. Consider an MRI of the brain with contrast to exclude demyelinating disease or much less likely an underlying mass. Electronically Signed: Genaro Fish MD  1/20/2025 5:34 PM EST  Workstation ID: [id number]    XR Chest 1 View    Result Date: 1/20/2025  Impression: No evidence of active chest disease. Electronically Signed: Desmond Edmondson MD  1/20/2025 4:26 PM EST  Workstation ID: [id number]       Assessment/Plan:     this is a 59-year-old white male, right-handed with multiple vascular risk factor who presents to [facility] ED for left upper extremity numbness and heaviness on and off and  decreased fine motor function over the last 2 weeks with persistence till today that lasted longer than usual that prompted patient to go to the ED for further evaluation.  Patient is deemed to be not a candidate for IV thrombolytic therapy secondary to last normal well outside the window, patient is deemed not to be a candidate for mechanical thrombectomy no LVO or perfusion deficit on CTA head and neck and CT perfusion.    Antiplatelet PTA: None  Anticoagulant PTA: None                                                                         Left upper extremity paresthesias and paresis, decreased fine motor function to left hand.  Multifocal acute punctate infarct found on MRI - etiology; cardioembolic with enhanced ring at right corpus callosum white matter related to demyelination versus white matter microvascular disease that needed MRI with contrast evaluation.  -TIA\ischemic stroke without IV thrombolytic therapy order set in place  -CT head without contrast negative for hemorrhage no acute abnormality, CTA head and neck atherosclerosis with stenosis of right ICA proximal, no large vessel occlusion intracranial, no perfusion deficit  -MRI without contrast remarkable for multifocal punctate acute infarct bilateral hemisphere etiology looks like cardioembolic phenomena.,  Enhanced ring at the right corpus callosum.  -MRI with contrast ordered and pending to rule out any MS pathology  -MRI of thoracic and lumbar spine with contrast to rule out any MS  -Echo ordered and pending  -A1c and lipid profile ordered and pending  -Will initiate DAPT, aspirin 81 mg p.o. or rectal 300 mg, Plavix 300 mg loading dose followed by 75 mg will continue DAPT for 21 days then monotherapy with aspirin, medical management can be changed if A-fib found on echo.  -If no A-fib on echo patient will need cardiac monitor on discharge.  -N.p.o. till dysphagia screen is passed, if okay cardiac diet is recommended  -Will initiate high  intensity statin Lipitor 80 mg p.o. at night and daily for secondary stroke prevention monitor any myalgia.  Will adjust medication accordingly.  -Patient reported prior myalgia with Lipitor years ago will monitor for now patient is agreeable to receive high intensity statin for secondary stroke prevention.  -Systolic blood pressure goal 1 20-1 80 till evaluation of carotid ultrasound.  -Carotid ultrasound ordered and pending to evaluate ICAs stenosis found on CTA head and neck  -PT\OT\SLP evaluation  -Case management for final discharge planning  -NIH\neurocheck per protocol  -Neurology stroke will continue to follow-up      Essential hypertension with uncontrolled blood pressure  -Systolic blood pressure goal 1 20-1 80 till carotid ultrasound evaluation.  -Management by medicine team    Hyperlipidemia  -Lipid profile ordered and pending high intensity statin as above  -Patient is agreeable to receive medication    Medication noncompliance  -Counseling on critical adherence to medical management to avoid complications, and stroke secondary prevention's, patient is agreeable and verbalized understanding    Great toe amputation in the setting of prior history of work-related injury  Gait instability using a cane  -PT\OT evaluation  -Case management for final discharge planning    Overweight  -BMI 28.66  -Complicates all aspects of care  -Counseling on healthy diet exercise and weight loss    I discussed plan of care with patient, wife, primary RN.  ED physician.  Neurology stroke will continue to follow-up.  Thank you for letting us participate in patient care.    DUGLAS Castañeda  January 20, 2025  21:07 EST

## 2025-01-21 NOTE — ED NOTES
" Jorgito Luis    Nursing Report ED to Floor:  Mental status: a&ox4  Ambulatory status: ual  Oxygen Therapy:  ra  Cardiac Rhythm: nsr  Admitted from: home  Safety Concerns:  none  Social Issues: none  ED Room #:  11    ED Nurse Phone Extension - [phone] or may call [phone].      HPI:   Chief Complaint   Patient presents with    Numbness       Past Medical History:  Past Medical History:   Diagnosis Date    Arthritis     Cancer 2014    colon-rectum    Difficulty in walking     GERD (gastroesophageal reflux disease)     history of    Hearing loss     Hypertension     states he takes his meds \"as needed\" based on blood pressures - 5/20/24    Memory loss     Sleep apnea     does not use CPAP        Past Surgical History:  Past Surgical History:   Procedure Laterality Date    ABDOMINAL SURGERY  2014    colon cancer removed rectum - [facility]    ENDOSCOPY N/A 04/05/2024    Procedure: ESOPHAGOGASTRODUODENOSCOPY WITH FORIEGN BODY REMOVAL;  Surgeon: Jacki Perera MD;  Location: [facility] ENDOSCOPY;  Service: Gastroenterology;  Laterality: N/A;    ILEOSTOMY CLOSURE  06/2015    [place]    KNEE ARTHROSCOPY Left     meniscus    LOW ANTERIOR BOWEL RESECTION  04/2015    with ileostomy; [place] - see care everywhere    TOE AMPUTATION Left 01/2024        Admitting Doctor:   Lia Allen MD    Consulting Provider(s):  Consults       No orders found from 12/22/2024 to 1/21/2025.             Admitting Diagnosis:   The primary encounter diagnosis was Acute left-sided weakness. Diagnoses of Paresthesias, Acute ischemic stroke, and Stenosis of right carotid artery were also pertinent to this visit.    Most Recent Vitals:   Vitals:    01/20/25 1527 01/20/25 1848 01/20/25 1850 01/20/25 1900   BP: (!) 181/106 (!) 173/106  (!) 170/128   BP Location: Left arm      Patient Position: Sitting      Pulse: 78  74 78   Resp: 18      Temp: 98 °F (36.7 °C)      TempSrc: Oral      SpO2: 97%  98% 96%   Weight: 93.2 kg (205 lb 7.5 oz)    " "  Height: 180.3 cm (71\")          Active LDAs/IV Access:   Lines, Drains & Airways       Active LDAs       Name Placement date Placement time Site Days    Peripheral IV 01/20/25 1603 Left Antecubital 01/20/25  1603  Antecubital  less than 1                    Labs (abnormal labs have a star):   Labs Reviewed   CBC WITH AUTO DIFFERENTIAL - Abnormal; Notable for the following components:       Result Value    Lymphocyte % 18.1 (*)     All other components within normal limits   PROTIME-INR - Normal   APTT - Normal    Narrative:     PTT = The equivalent PTT values for the therapeutic range of heparin levels at 0.3 to 0.5 U/ml are 60 to 70 seconds.   POCT CHEM 8 - Normal   RAINBOW DRAW    Narrative:     The following orders were created for panel order [place] Draw.  Procedure                               Abnormality         Status                     ---------                               -----------         ------                     Green Top (Gel)[[id number]]                                  Final result               Lavender Top[[id number]]                                     Final result               Gold Top - SST[[id number]]                                   Final result               Macdonald Top[[id number]]                                         Final result               Light Blue Top[[id number]]                                   Final result                 Please view results for these tests on the individual orders.   CBC AND DIFFERENTIAL    Narrative:     The following orders were created for panel order CBC & Differential.  Procedure                               Abnormality         Status                     ---------                               -----------         ------                     CBC Auto Differential[[id number]]        Abnormal            Final result                 Please view results for these tests on the individual orders.   GREEN TOP   LAVENDER TOP   GOLD TOP - SST   GRAY TOP   LIGHT " BLUE TOP       Meds Given in ED:   Medications   sodium chloride 0.9 % flush 10 mL (has no administration in time range)   iopamidol (ISOVUE-370) 76 % injection 115 mL (115 mL Intravenous Given 1/20/25 1806)           Last NIH score:  Interval: baseline  1a. Level of Consciousness: 0-->Alert, keenly responsive  1b. LOC Questions: 0-->Answers both questions correctly  1c. LOC Commands: 0-->Performs both tasks correctly  2. Best Gaze: 0-->Normal  3. Visual: 0-->No visual loss  4. Facial Palsy: 0-->Normal symmetrical movements  5a. Motor Arm, Left: 0-->No drift, limb holds 90 (or 45) degrees for full 10 secs  5b. Motor Arm, Right: 0-->No drift, limb holds 90 (or 45) degrees for full 10 secs  6a. Motor Leg, Left: 0-->No drift, leg holds 30 degree position for full 5 secs  6b. Motor Leg, Right: 0-->No drift, leg holds 30 degree position for full 5 secs  7. Limb Ataxia: 0-->Absent  8. Sensory: 0-->Normal, no sensory loss  9. Best Language: 0-->No aphasia, normal  10. Dysarthria: 0-->Normal  11. Extinction and Inattention (formerly Neglect): 0-->No abnormality    Total (NIH Stroke Scale): 0     Dysphagia screening results:  Patient Factors Component (Dysphagia:Stroke or Rule-out)  Best Eye Response: 4-->(E4) spontaneous (01/20/25 2035)  Best Motor Response: 6-->(M6) obeys commands (01/20/25 2035)  Best Verbal Response: 5-->(V5) oriented (01/20/25 2035)  Marlow Coma Scale Score: 15 (01/20/25 2035)  Is there Facial Asymmetry/Weakness?: No (01/20/25 2035)  Is there Tongue Asymmetry/Weakness?: No (01/20/25 2035)  Is there Palatal Asymmetry/Weakness?: No (01/20/25 2035)  Patient Assessment Result: Pass - Proceed to Water Test (01/20/25 2035)     Aurea Coma Scale:  No data recorded     CIWA:        Restraint Type:            Isolation Status:  No active isolations

## 2025-01-21 NOTE — CASE MANAGEMENT/SOCIAL WORK
Discharge Planning Assessment  [facility]     Patient Name: Jorgito Luis  MRN: [id number]  Today's Date: 1/21/2025    Admit Date: 1/20/2025    Plan: Home   Discharge Needs Assessment       Row Name 01/21/25 0838       Living Environment    People in Home spouse    Current Living Arrangements home    Potentially Unsafe Housing Conditions none    Primary Care Provided by self    Provides Primary Care For no one    Family Caregiver if Needed spouse    Quality of Family Relationships involved;supportive    Able to Return to Prior Arrangements yes       Resource/Environmental Concerns    Resource/Environmental Concerns none    Transportation Concerns none       Transition Planning    Patient/Family Anticipates Transition to home with family    Patient/Family Anticipated Services at Transition ;rehabilitation services    Transportation Anticipated family or friend will provide       Discharge Needs Assessment    Readmission Within the Last 30 Days no previous admission in last 30 days    Equipment Currently Used at Home cane, straight;shower chair    Concerns to be Addressed discharge planning    Anticipated Changes Related to Illness none                   Discharge Plan       Row Name 01/21/25 0838       Plan    Plan Home    Patient/Family in Agreement with Plan yes    Plan Comments Spoke with patient in room to initiate discharge planning.  He lives with his wife in [facility].  He is independent with ADL's, using a straight cane to assist with mobility.  He also has a shower chair.  He is not current with home health.  His PCP is Meseret Martinez.  He does not have an advanced directive.  Verified that he has [facility] Medicaid.  Mr. Luis has RX coverage and has his scripts filled at [facility].  His goal is to return home at discharge.  Will await therapy recommendations to determine proper discharge placement.  CM will continue to follow.    Final Discharge Disposition Code 01 - home or self-care                   Continued Care and Services - Admitted Since 1/20/2025    No active coordination exists for this encounter.       Expected Discharge Date and Time       Expected Discharge Date Expected Discharge Time    Jan 22, 2025            Demographic Summary       Row Name 01/21/25 0837       General Information    Admission Type inpatient    Arrived From emergency department    Referral Source admission list    Reason for Consult discharge planning    Preferred Language English                   Functional Status       Row Name 01/21/25 0837       Functional Status    Usual Activity Tolerance moderate    Current Activity Tolerance moderate       Functional Status, IADL    Medications assistive equipment    Meal Preparation assistive equipment and person    Housekeeping assistive equipment and person    Laundry assistive equipment and person    Shopping assistive equipment and person                   Psychosocial    No documentation.                  Abuse/Neglect    No documentation.                  Legal    No documentation.                  Substance Abuse    No documentation.                  Patient Forms    No documentation.                     Joan Painter RN

## 2025-01-29 ENCOUNTER — HOSPITAL ENCOUNTER (OUTPATIENT)
Dept: CARDIOLOGY | Facility: HOSPITAL | Age: 60
Discharge: HOME | End: 2025-01-29
Payer: COMMERCIAL

## 2025-01-29 ENCOUNTER — OFFICE VISIT (OUTPATIENT)
Dept: CARDIOLOGY | Facility: HOSPITAL | Age: 60
End: 2025-01-29
Payer: COMMERCIAL

## 2025-01-29 VITALS
BODY MASS INDEX: 29.62 KG/M2 | HEIGHT: 71 IN | OXYGEN SATURATION: 97 % | WEIGHT: 211.6 LBS | RESPIRATION RATE: 18 BRPM | HEART RATE: 85 BPM | SYSTOLIC BLOOD PRESSURE: 124 MMHG | DIASTOLIC BLOOD PRESSURE: 70 MMHG

## 2025-01-29 DIAGNOSIS — I63.9 CARDIOEMBOLIC STROKE: ICD-10-CM

## 2025-01-29 DIAGNOSIS — R06.83 SNORING: ICD-10-CM

## 2025-01-29 DIAGNOSIS — R00.2 PALPITATIONS: ICD-10-CM

## 2025-01-29 DIAGNOSIS — E78.5 HYPERLIPIDEMIA, UNSPECIFIED HYPERLIPIDEMIA TYPE: ICD-10-CM

## 2025-01-29 DIAGNOSIS — I10 PRIMARY HYPERTENSION: ICD-10-CM

## 2025-01-29 DIAGNOSIS — R00.2 PALPITATIONS: Primary | ICD-10-CM

## 2025-01-29 NOTE — PROGRESS NOTES
[facility] Heart Monitor Documentation    Jorgito Luis  1965  [id number]  01/29/25      [] ZIO XT Patch  Model [id number] Prescribed for  Days    Serial Number: (N + 9 Digits) N   Apply-By Date on Box:   USPS Tracking Number:   USPS Tracking        [x] [employer] MINI PLUS Mobile Cardiac Telemetry  Model BGMINIPLUS Prescribed for 30 Days    Serial Number: (BGM + 7 Digits) [id number]  Shipped-By Date on Box: 01/16/2025  UPS Tracking Number: 1Z  UPS Tracking      [] [employer] MINI Holter Monitor  Model BGMINIEL Prescribed for  Days    Serial Number: (7 Digits)   Shipped-By Date on Box:   UPS Tracking Number: 1Z  UPS Tracking        This monitor was applied to the patient's chest and checked for proper functioning.  Mr. Jorgito Luis was instructed in the proper use of this monitor.  He was given the opportunity to ask questions and left the office with the device 's instruction manual.    Carissa Covarrubias, [facility], 14:25 EST, 01/29/25                  [facility] Center[facility]IT[facility]UMENTATION 8.8.2019

## 2025-01-29 NOTE — PROGRESS NOTES
"[facility] Heart [facility] Vascular    Chief Complaint  Cerebrovascular Accident    Subjective    History of Present Illness {CC  Problem List  Visit  Diagnosis   Encounters  Notes  Medications  Labs  Result Review Imaging  Media :23}     Jorgito Luis presents to [facility] CARDIOLOGY for   History of Present Illness     59-year-old male with history of hypertension, hyperlipidemia, general anxiety disorder, CVA, carotid artery stenosis.      Hx of sleep study but was not able to complete. Was given the machine but did not use.     Hx of snoring, no observed apnea.     Patient presented to [facility] 1/20/2025, who presented with left arm weakness found to have subacute CVA, right ICA stenosis.    Patient with multifocal acute punctate infarcts possible cardioembolic.    Echo negative for bubble study.  CTA of the head shows 60 to 80% stenosis of the right ICA.    Discharged on the DAPT for 21 days then aspirin monotherapy.      Patient with history of statin intolerance.  Initiated rosuvastatin 40 mg and Zetia 10 mg.  Tolerating at this time.          Objective     Vital Signs:   Vitals:    01/29/25 1411 01/29/25 1413 01/29/25 1436   BP: 161/92 150/89 124/70   BP Location: Left arm Left arm    Patient Position: Sitting Standing    Cuff Size: Adult Adult    Pulse: 77 85    Resp: 18     SpO2: 98% 97%    Weight: 96 kg (211 lb 9.6 oz)     Height: 180.3 cm (71\")       Body mass index is 29.51 kg/m².  Physical Exam  Vitals reviewed.   Constitutional:       General: He is not in acute distress.  Neck:      Vascular: No carotid bruit.   Cardiovascular:      Rate and Rhythm: Normal rate and regular rhythm.      Heart sounds: No murmur heard.  Pulmonary:      Effort: Pulmonary effort is normal.      Breath sounds: Normal breath sounds.   Musculoskeletal:      Right lower leg: No edema.      Left lower leg: No edema.   Skin:     Coloration: Skin is " not pale.   Neurological:      Mental Status: He is alert.   Psychiatric:         Mood and Affect: Mood normal.         Behavior: Behavior normal. Behavior is cooperative.              Result Review  Data Reviewed:{ Labs  Result Review  Imaging  Med Tab  Media :23}   Echocardiogram 1/21/2025: EF 61 to 65%, saline test negative, no concerning valvular disease, grade 1 diastolic dysfunction    Bilateral carotid duplex 1/21/2025:   Right internal carotid artery demonstrates a 50-69% stenosis.    Antegrade right vertebral flow.    Left internal carotid artery demonstrates a less than 50% stenosis.    Antegrade left vertebral flow.    CT angiogram head and neck:  Impression   There is between 60 and 80% narrowing of the proximal right internal carotid artery. No vessel occlusion is seen. No evidence of infarct on CT perfusion.       Lab Results   Component Value Date    WBC 8.00 01/20/2025    HGB 16.7 01/20/2025    HCT 49 01/20/2025    MCV 88.0 01/20/2025     01/20/2025     Lab Results   Component Value Date    GLUCOSE 104 (H) 02/03/2015    BUN 9 02/03/2015    CREATININE 0.80 01/20/2025     02/03/2015    K 4.2 02/03/2015     02/03/2015    CALCIUM 8.6 (L) 02/03/2015    PROTEINTOT 5.8 02/03/2015    ALBUMIN 3.6 02/03/2015    ALT 23 02/03/2015    AST 19 02/03/2015    ALKPHOS 45 02/03/2015    BILITOT 1.0 02/03/2015    BCR 12.9 10/16/2014    ANIONGAP 11 02/03/2015    EGFR 102.6 01/20/2025     Lab Results   Component Value Date    TSH 2.17 10/16/2014     Lab Results   Component Value Date    CHOL 422 (H) 01/21/2025    CHLPL 364 (H) 10/16/2014     Lab Results   Component Value Date    TRIG 132 01/21/2025    TRIG 155 (H) 10/16/2014     Lab Results   Component Value Date    HDL 40 01/21/2025    HDL 33 (L) 10/16/2014     Lab Results   Component Value Date     (H) 01/21/2025     (H) 10/16/2014                   Assessment and Plan {CC Problem List  Visit Diagnosis  ROS  Review (Popup)   Health Maintenance  Quality  BestPractice  Medications  SmartSets  SnapShot Encounters  Media :23}   1. Palpitations    - Cardiac Event Monitor (ALLY) or Mobile Cardiac Outpatient Telemetry (MCT); Future    2. Primary hypertension  Repeat BP improved  Home log reported 120's    3. Hyperlipidemia, unspecified hyperlipidemia type  Statin  Hx of myalgias.  Tolerating statin thus far    4. Cardioembolic stroke  Suspected cardioembolic CVA  Discuss loop depending heart monitor results  - Cardiac Event Monitor (ALLY) or Mobile Cardiac Outpatient Telemetry (MCT); Future  Continue asa, statin, plavix   F/u with neurology as scheduled.     5 snoring  Hx of sleep study, but unable to sleep.  No results        Follow Up {Instructions Charge Capture  Follow-up Communications :23}   Return in about 6 weeks (around 3/12/2025), or if symptoms worsen or fail to improve, for palpitations, cva, monitor results.    Patient was given instructions and counseling regarding his condition or for health maintenance advice. Please see specific information pulled into the AVS if appropriate.  Patient was instructed to call the [facility] with any questions, concerns, or worsening symptoms.   complains of pain/discomfort

## 2025-02-03 ENCOUNTER — TELEMEDICINE (OUTPATIENT)
Dept: GASTROENTEROLOGY | Facility: CLINIC | Age: 60
End: 2025-02-03
Payer: COMMERCIAL

## 2025-02-03 DIAGNOSIS — Z87.19 HISTORY OF ESOPHAGEAL ULCER: ICD-10-CM

## 2025-02-03 DIAGNOSIS — R13.19 ESOPHAGEAL DYSPHAGIA: Primary | ICD-10-CM

## 2025-02-03 DIAGNOSIS — Z98.890 HISTORY OF RECTAL SURGERY: ICD-10-CM

## 2025-02-03 DIAGNOSIS — Z85.048 HISTORY OF RECTAL CANCER: ICD-10-CM

## 2025-02-03 DIAGNOSIS — K64.9 HEMORRHOIDS, UNSPECIFIED HEMORRHOID TYPE: ICD-10-CM

## 2025-02-03 LAB
QT INTERVAL: 400 MS
QTC INTERVAL: 422 MS

## 2025-02-03 PROCEDURE — 99214 OFFICE O/P EST MOD 30 MIN: CPT | Performed by: PHYSICIAN ASSISTANT

## 2025-02-03 NOTE — PROGRESS NOTES
Follow Up Note     Date: 2025   Patient Name: Jorgito Luis  MRN: [id number]  : 1965     Primary Care Provider: Meseret Martinez APRN     Chief Complaint   Patient presents with    Difficulty Swallowing     History of present illness:   2/3/2025  Jorgito Luis is a 59 y.o. male who is here today on video visit for follow up regarding Difficulty Swallowing.    Still with dysphagia. Still swallowing ice PRN dysphagia which helps. He is still considering having repeat EGD but wants to think on this longer. No abdominal pain. No significant reflux, heartburn or nausea.     Was diagnosed with a stroke recently, on several new medications. He has noticed more constipation with this. Taking Metamucil daily plus miralax PRN which helps. No new GI complaints.     Interval History:  10/2024  He did not keep previously scheduled EGD due to some improvements in dysphagia since he started swallowing ice. He thought of this while praying one day, tried it and it helped him. Each time he has some difficulty getting food down, he will swallow ice and it seems to help. No longer taking any anti-acids. No longer taking pepcid. All medications tried for reflux in the past have caused constipation and he was not able to tolerate. He does not like taking a lot of medications. He is not able to swallow pills. Takes Carafate PRN indigestion and burning when present but avoids because this causes constipation. Still has difficulty eating meats. He worries while eating that he is going to have food stuck.     He had prior colorectal surgery approx 20 years ago, reports had 18 inches removed including the rectum. Since then, he has had difficulty holding BMs, has severe fecal urgency and intermittent fecal incontinence. He has episodes of sudden onset diarrhea which is intermittent. He has to carry extra clothes and sometimes wear briefs/pads due to fear of incontinence. Sometimes he will have diffuse weakness after a  "severe episode of fecal urgency with several stools. He will sit on the toilet for up to an hour at times due to constant urgency. Has 3 urgent stools after each meal most days, sometimes up to 12 stools per day, soft looser stools. He has fluctuation to constipation at times. When constipated will still have 2-3 stools daily small round balls of stools.     He had an accident at work. Had to have toe amputation. He was diagnosed with PTSD but those medications caused a change (worse fluctuation) in his bowel habits and mostly constipation. This makes episodes of fecal urgency worse. Tried (buspar and atarax).    Subjective     Past Medical History:   Diagnosis Date    Arthritis     Cancer 2014    colon-rectum    Difficulty in walking     GERD (gastroesophageal reflux disease)     history of    Hearing loss     Hypertension     states he takes his meds \"as needed\" based on blood pressures - 5/20/24    Memory loss     Sleep apnea     does not use CPAP     Past Surgical History:   Procedure Laterality Date    ABDOMINAL SURGERY  2014    colon cancer removed rectum - [facility]    ENDOSCOPY N/A 04/05/2024    Procedure: ESOPHAGOGASTRODUODENOSCOPY WITH FORIEGN BODY REMOVAL;  Surgeon: aJcki Perera MD;  Location: [facility] ENDOSCOPY;  Service: Gastroenterology;  Laterality: N/A;    ILEOSTOMY CLOSURE  06/2015    [facility]    KNEE ARTHROSCOPY Left     meniscus    LOW ANTERIOR BOWEL RESECTION  04/2015    with ileostomy; [facility] - see care everywhere    TOE AMPUTATION Left 01/2024     Family History   Problem Relation Age of Onset    Hypertension Mother     Arthritis Mother     Cancer Mother     Diabetes Mother     Heart disease Mother     Hyperlipidemia Mother     Malig Hypertension Mother     Hypertension Father     Arthritis Father     Alcohol abuse Father     Diabetes Father     Heart disease Father     Malig Hypertension Father     Obesity Father      Social History     Socioeconomic History    Marital status: "    Tobacco Use    Smoking status: Never    Smokeless tobacco: Never   Vaping Use    Vaping status: Never Used   Substance and Sexual Activity    Alcohol use: Never    Drug use: Never    Sexual activity: Defer       Current Outpatient Medications:     acetaminophen (TYLENOL) 160 MG/5ML liquid, Take  by mouth As Needed., Disp: , Rfl:     ascorbic acid (VITAMIN C) 250 MG tablet, Take 1 tablet by mouth Daily., Disp: , Rfl:     aspirin 81 MG chewable tablet, Chew 1 tablet Daily for 60 days., Disp: 60 tablet, Rfl: 0    CBD (cannabidiol) oral oil, Take  by mouth As Needed., Disp: , Rfl:     Cholecalciferol 25 MCG (1000 UT) tablet, Take 1 tablet by mouth Daily., Disp: , Rfl:     clopidogrel (PLAVIX) 75 MG tablet, Take 1 tablet by mouth Daily., Disp: 21 tablet, Rfl: 0    ezetimibe (Zetia) 10 MG tablet, Take 1 tablet by mouth Daily for 30 days., Disp: 30 tablet, Rfl: 0    Hydrocortisone Max St 1 % cream, Apply  topically to the appropriate area as directed As Needed., Disp: , Rfl:     hydrOXYzine (ATARAX) 10 MG tablet, Take 1-2 tablets by mouth 3 (Three) Times a Day As Needed for Anxiety., Disp: , Rfl:     ibuprofen (ADVIL,MOTRIN) 100 MG/5ML suspension, Every 6 (Six) Hours As Needed., Disp: , Rfl:     ivermectin (STROMECTOL) 3 MG tablet tablet, As Needed., Disp: , Rfl:     L-THEANINE PO, Take  by mouth Daily., Disp: , Rfl:     Lidoderm 5 %, Place 1 patch on the skin as directed by provider As Needed., Disp: , Rfl:     losartan (COZAAR) 25 MG tablet, Take 1 tablet by mouth As Needed (pt states as needed based on blood pressure). (Patient taking differently: Take 1 tablet by mouth Daily.), Disp: , Rfl:     Magnesium 400 MG tablet, Take 400 mg by mouth Daily., Disp: , Rfl:     Melatonin-Pyridoxine (MELATONIN CR PO), Take  by mouth At Night As Needed., Disp: , Rfl:     polyethylene glycol (MIRALAX) 17 GM/SCOOP powder, Take 17 g by mouth Daily As Needed (constipation). Follow directions given at office, Disp: 238 g, Rfl:  5    psyllium (METAMUCIL) 58.6 % powder, Take  by mouth 2 (Two) Times a Day., Disp: 425 g, Rfl: 5    rosuvastatin (CRESTOR) 40 MG tablet, Take 1 tablet by mouth Every Night., Disp: 90 tablet, Rfl: 0    sucralfate (CARAFATE) 1 g tablet, Take 1 tablet by mouth Daily As Needed., Disp: , Rfl:     albuterol (PROVENTIL) (2.5 MG/3ML) 0.083% nebulizer solution, USE 3 ML VIA NEBULIZER THREE TIMES DAILY AS NEEDED (Patient not taking: Reported on 2/3/2025), Disp: , Rfl:     busPIRone (BUSPAR) 5 MG tablet, Take 1 tablet by mouth. (Patient not taking: Reported on 2/3/2025), Disp: , Rfl:     lisinopril (PRINIVIL,ZESTRIL) 10 MG tablet, Take 1 tablet by mouth As Needed (states he takes as needed based on blood pressures). (Patient not taking: Reported on 2/3/2025), Disp: , Rfl:     Turmeric 500 MG capsule, Take 1 capsule by mouth Daily. (Patient not taking: Reported on 2/3/2025), Disp: , Rfl:     witch hazel-glycerin (TUCKS) pad, Apply  topically to the appropriate area as directed As Needed for Hemorrhoids., Disp: 100 each, Rfl: 5    Allergies   Allergen Reactions    Vancomycin Itching     Has to be given benadryl before he can take vancomycin    Lansoprazole Other (See Comments) and Rash     Mouth blisters    Statins Myalgia     Hurt his legs when he took them     The following portions of the patient's history were reviewed and updated as appropriate: allergies, current medications, past family history, past medical history, past social history, past surgical history and problem list.    Objective     Physical Exam  Constitutional:       General: He is not in acute distress.     Appearance: Normal appearance. He is well-developed. He is not ill-appearing.   HENT:      Head: Normocephalic and atraumatic.      Nose: Nose normal.   Eyes:      General: No scleral icterus.  Pulmonary:      Effort: Pulmonary effort is normal. No respiratory distress.   Neurological:      Mental Status: He is alert and oriented to person, place, and  time.   Psychiatric:         Mood and Affect: Mood normal.         Behavior: Behavior normal.         Thought Content: Thought content normal.         Judgment: Judgment normal.       There were no vitals filed for this visit.- video visit    Results Review:   I reviewed the patient's new clinical results.     WBC 01/20/2025 8.00  3.40 - 10.80 10*3/mm3 Final    RBC 01/20/2025 5.35  4.14 - 5.80 10*6/mm3 Final    Hemoglobin 01/20/2025 16.1  13.0 - 17.7 g/dL Final    Hematocrit 01/20/2025 47.1  37.5 - 51.0 % Final    MCV 01/20/2025 88.0  79.0 - 97.0 fL Final    MCH 01/20/2025 30.1  26.6 - 33.0 pg Final    MCHC 01/20/2025 34.2  31.5 - 35.7 g/dL Final    RDW 01/20/2025 12.8  12.3 - 15.4 % Final    RDW-SD 01/20/2025 41.4  37.0 - 54.0 fl Final    MPV 01/20/2025 10.0  6.0 - 12.0 fL Final    Platelets 01/20/2025 315  140 - 450 10*3/mm3 Final    C-Reactive Protein 01/21/2025 0.42  0.00 - 0.50 mg/dL Final    Sed Rate 01/21/2025 16  0 - 20 mm/hr Final      COMPREHENSIVE METABOLIC PANEL (10/13/2023 01:26)  CBC AND DIFFERENTIAL (11/09/2023 15:15)     EGD 4/5/2024  - Food in the lower third of the esophagus. Removal was successful. - Esophageal ulcer with no stigmata of recent bleeding due to prolonged food impaction. - High-grade of narrowing Schatzki ring. Scope passed after gentle manipulation - Erythematous mucosa in the posterior wall of the stomach, antrum and prepyloric region of the stomach. - Normal duodenal bulb, first portion of the duodenum, second portion of the duodenum and third portion of the duodenum.  Patient declined any biopsies at this time    Assessment / Plan      1. Esophageal dysphagia  2. History of esophageal ulcer  Had food bolus 4/5/2024, had EGD at that time. Had Schatzki's ring noted and slight manipulation allowed scope to pass. He declined biopsies previously. Had esophageal ulcer. Unable to swallow pills. Took prevacid only for a couple of days and discontinued on his own. He is worried about  medication side effects. Tried pepcid suspension without help. Has had dysphagia for many years, feels that swallowing ice helps.     Repeat EGD as previously recommended, he has declined at this time, will think about it    3. Hemorrhoids, unspecified hemorrhoid type  4. History of rectal surgery  5. History of rectal cancer (20 years ago)  Long history of fecal urgency, intermittent fecal incontinence and frequent stools. He was having anywhere from 2-12 urgent stools per day. Recently, has noticed a change in bowel habits to constipation. He has been taking metamucil daily plus miralax PRN constipation which helps. Does have intermittent mild anal pain which he relates to hemorrhoids. Had diagnosis of rectal cancer approx 20 years ago. He had rectal surgery at [place]. He reports 18 inches of colon + rectum removed at that time. Full details on this are unknown. He has not had any colonoscopy since then.      Continue fiber supplement daily  Continue Miralax PRN constipation  Colonoscopy recommended, he has declined   Ok to use TUCKS PRN hemorrhoids, sent at his request    - witch hazel-glycerin (TUCKS) pad; Apply  topically to the appropriate area as directed As Needed for Hemorrhoids.  Dispense: 100 each; Refill: 5      This was an audio and video enabled telemedicine encounter.  Jorgito Luis verbally consented to a telehealth visit. Jorgito was seen via telehealth using real-time video conferencing technology by Trisha Capps PA-C. This visit was requested due to personal reasons. We connected on video but had audio issues and converted to audio only after that. Jorgito was located at home in [place], [place], and Génesis was located at [facility] in [place], [place]. Jorgito and Génesis participated in the telehealth visit. The telehealth visit started 3:17 PM and ended at 3:24 PM.     Follow Up:   Return if symptoms worsen or fail to improve. He will call back to arrange visit if needed. Will let us know  if he decides to proceed with any endoscopic procedure.     Trisha Capps PA-C  [facility] [place]  2/3/2025  15:40 EST    Dictated Utilizing Dragon Dictation: Part of this note may be an electronic transcription/translation of spoken language to printed text using the Dragon Dictation System.

## 2025-02-04 ENCOUNTER — OFFICE VISIT (OUTPATIENT)
Dept: INTERNAL MEDICINE | Facility: CLINIC | Age: 60
End: 2025-02-04
Payer: COMMERCIAL

## 2025-02-04 VITALS
OXYGEN SATURATION: 96 % | WEIGHT: 210 LBS | RESPIRATION RATE: 16 BRPM | HEIGHT: 71 IN | SYSTOLIC BLOOD PRESSURE: 118 MMHG | DIASTOLIC BLOOD PRESSURE: 82 MMHG | HEART RATE: 79 BPM | TEMPERATURE: 97.6 F | BODY MASS INDEX: 29.4 KG/M2

## 2025-02-04 DIAGNOSIS — I63.9 CEREBROVASCULAR ACCIDENT (CVA), UNSPECIFIED MECHANISM: ICD-10-CM

## 2025-02-04 DIAGNOSIS — I65.21 STENOSIS OF RIGHT CAROTID ARTERY: ICD-10-CM

## 2025-02-04 DIAGNOSIS — Z76.89 ENCOUNTER TO ESTABLISH CARE: Primary | ICD-10-CM

## 2025-02-04 DIAGNOSIS — I10 ESSENTIAL HYPERTENSION: ICD-10-CM

## 2025-02-04 DIAGNOSIS — E78.2 MIXED HYPERLIPIDEMIA: ICD-10-CM

## 2025-02-04 PROBLEM — R26.81 GAIT INSTABILITY: Status: ACTIVE | Noted: 2024-09-23

## 2025-02-04 PROBLEM — M77.42 METATARSALGIA OF LEFT FOOT: Status: ACTIVE | Noted: 2024-04-01

## 2025-02-04 PROBLEM — S98.122A: Status: ACTIVE | Noted: 2023-10-13

## 2025-02-04 PROBLEM — S97.82XS: Status: ACTIVE | Noted: 2024-01-08

## 2025-02-04 PROBLEM — I96 GANGRENE OF TOE OF LEFT FOOT: Status: ACTIVE | Noted: 2023-11-06

## 2025-02-04 PROBLEM — I96 DRY GANGRENE: Status: ACTIVE | Noted: 2023-11-09

## 2025-02-04 PROBLEM — L97.522 CHRONIC TOE ULCER, LEFT, WITH FAT LAYER EXPOSED: Status: ACTIVE | Noted: 2023-11-06

## 2025-02-04 PROCEDURE — 99495 TRANSJ CARE MGMT MOD F2F 14D: CPT | Performed by: NURSE PRACTITIONER

## 2025-02-04 PROCEDURE — 1126F AMNT PAIN NOTED NONE PRSNT: CPT | Performed by: NURSE PRACTITIONER

## 2025-02-04 PROCEDURE — 3074F SYST BP LT 130 MM HG: CPT | Performed by: NURSE PRACTITIONER

## 2025-02-04 PROCEDURE — 3079F DIAST BP 80-89 MM HG: CPT | Performed by: NURSE PRACTITIONER

## 2025-02-04 PROCEDURE — 1159F MED LIST DOCD IN RCRD: CPT | Performed by: NURSE PRACTITIONER

## 2025-02-04 PROCEDURE — 1160F RVW MEDS BY RX/DR IN RCRD: CPT | Performed by: NURSE PRACTITIONER

## 2025-02-04 NOTE — PROGRESS NOTES
Transitional Care Follow Up Visit  Subjective     Jorgito Luis is a 59 y.o. male who presents for a transitional care management visit.    Within 48 business hours after discharge our office contacted him via telephone to coordinate his care and needs.      I reviewed and discussed the details of that call along with the discharge summary, hospital problems, inpatient lab results, inpatient diagnostic studies, and consultation reports with Jorgito.     Current outpatient and discharge medications have been reconciled for the patient.  Reviewed by: DUGLAS Blank           No data to display              Risk for Readmission (LACE) Score: 4 (1/21/2025  6:00 AM)      History of Present Illness   Course During Hospital Stay:  Presented to [facility] with acute left arm weakness on 1/20 and diagnoed with subacute CVA and right ICA stenosis. Stroke neurology was consulted during hospitalization and MRI concerning for multiple punctate infarcts possible cardioembolic with other concerns for demyelinating process. Negative bubble study. Carotid US showed right ICA to be 50-69% occluded. Recommended to continue DAPT with aspirin and plavix for 21 days followed by aspirin monotherapy. He was also started on roisuvastatin and ezetimibe for hyperlipidemia, historically could not tolerate statin therapy. General neurology was consulted for possible demyelinating process, he declined LP during hospitalization and was scheduled with Dr. Ramos outpatient. He was discharged home in stable condition on 1/21 with instruction to follow-up with cardiology, PCP, and neurology.     He did follow-up with cardiology and currently wearing heart monitor. He has been doing well post hospitalization with no acute complaints. He suspects statin is causing constipation, he deals with this chronically but has been worsening. Currently using metamucil and miralax PRN. Following with GI and followed up with them yesterday. He is interested in  ivermectin therapy PRN for illness, previous PCP prescribed. He is taking losartan as prescribed, denies chest pain, shortness of breath, lower extremity swelling, dizziness and orthopnea. He has no acute needs from a primary care standpoint today.   Lab Results   Component Value Date    CHOL 422 (H) 01/21/2025    CHLPL 364 (H) 10/16/2014    TRIG 132 01/21/2025    HDL 40 01/21/2025     (H) 01/21/2025     Lab Results   Component Value Date    GLUCOSE 104 (H) 02/03/2015    BUN 9 02/03/2015    CREATININE 0.80 01/20/2025     02/03/2015    K 4.2 02/03/2015     02/03/2015    CALCIUM 8.6 (L) 02/03/2015    PROTEINTOT 5.8 02/03/2015    ALBUMIN 3.6 02/03/2015    ALT 23 02/03/2015    AST 19 02/03/2015    ALKPHOS 45 02/03/2015    BILITOT 1.0 02/03/2015    BCR 12.9 10/16/2014    ANIONGAP 11 02/03/2015    EGFR 102.6 01/20/2025          The following portions of the patient's history were reviewed and updated as appropriate: allergies, current medications, past family history, past medical history, past social history, past surgical history, and problem list.    Review of Systems   Constitutional:  Negative for appetite change, fatigue and fever.   HENT:  Positive for trouble swallowing. Negative for sore throat.    Eyes:  Negative for visual disturbance.   Respiratory:  Negative for cough, shortness of breath and wheezing.    Cardiovascular:  Negative for chest pain, palpitations and leg swelling.   Gastrointestinal:  Positive for constipation. Negative for abdominal pain, blood in stool, diarrhea, nausea and vomiting.   Endocrine: Negative for polydipsia, polyphagia and polyuria.   Genitourinary:  Negative for dysuria.   Musculoskeletal:  Negative for arthralgias, back pain and myalgias.   Skin:  Negative for rash.   Neurological:  Positive for weakness. Negative for dizziness and headaches.   Psychiatric/Behavioral:  Negative for sleep disturbance and suicidal ideas. The patient is not nervous/anxious.   "      Objective   /82   Pulse 79   Temp 97.6 °F (36.4 °C)   Resp 16   Ht 180.3 cm (71\")   Wt 95.3 kg (210 lb)   SpO2 96%   BMI 29.29 kg/m²   Physical Exam  Vitals and nursing note reviewed.   Constitutional:       General: He is not in acute distress.     Appearance: Normal appearance. He is not ill-appearing or toxic-appearing.   Eyes:      Pupils: Pupils are equal, round, and reactive to light.   Cardiovascular:      Rate and Rhythm: Normal rate and regular rhythm.      Heart sounds: Normal heart sounds. No murmur heard.  Pulmonary:      Effort: Pulmonary effort is normal. No respiratory distress.      Breath sounds: Normal breath sounds. No wheezing.   Abdominal:      General: Bowel sounds are normal. There is no distension.      Palpations: Abdomen is soft.      Tenderness: There is no abdominal tenderness.   Musculoskeletal:         General: Deformity present.      Cervical back: Neck supple.      Comments: Using cane for ambulation  Missing left great toe , surgically removed due to previous accident     Skin:     General: Skin is warm and dry.      Findings: No rash.   Neurological:      General: No focal deficit present.      Mental Status: He is alert and oriented to person, place, and time.      Motor: Weakness (baseline, left arm weakness resolved) present.   Psychiatric:         Mood and Affect: Mood normal.         Behavior: Behavior normal.         Assessment & Plan   Diagnoses and all orders for this visit:    1. Encounter to establish care (Primary)  -     Comprehensive metabolic panel; Future  -     Lipid panel; Future  Discussed medication therapy with him today will repeat labs in 6 weeks. No indication for ivermectin, I advised against this currently.   2. Mixed hyperlipidemia  -     Comprehensive metabolic panel; Future  -     Lipid panel; Future  Significant elevation, keep follow-up with cardiology. Recommend continuing rosuvastatin and ezetimibe.   3. Stenosis of right carotid " artery  -     Comprehensive metabolic panel; Future  -     Lipid panel; Future  Keep follow-up with cardiology, continue DAPT.   4. Essential hypertension  -     Comprehensive metabolic panel; Future  -     Lipid panel; Future  Stable. Continue current medications as prescribed. Recommend DASH diet, moderate-intensity exercises 4-5 times/week, medication compliance, and home blood pressure monitoring.   5. Cerebrovascular accident (CVA), unspecified mechanism  Keep follow-up with neurology in regards to possible demyelinating process. Continue cholesterol lowering medication and recommend tight blood pressure control. No acute needs for other services at this time in regards to recent CVA. Follow-up after neurology appointment.

## 2025-03-12 ENCOUNTER — TELEPHONE (OUTPATIENT)
Dept: CARDIOLOGY | Facility: HOSPITAL | Age: 60
End: 2025-03-12

## 2025-03-12 ENCOUNTER — OFFICE VISIT (OUTPATIENT)
Dept: CARDIOLOGY | Facility: HOSPITAL | Age: 60
End: 2025-03-12
Payer: COMMERCIAL

## 2025-03-12 VITALS
HEART RATE: 76 BPM | SYSTOLIC BLOOD PRESSURE: 123 MMHG | WEIGHT: 212.4 LBS | HEIGHT: 71 IN | BODY MASS INDEX: 29.73 KG/M2 | DIASTOLIC BLOOD PRESSURE: 78 MMHG | RESPIRATION RATE: 16 BRPM | OXYGEN SATURATION: 96 %

## 2025-03-12 DIAGNOSIS — I10 PRIMARY HYPERTENSION: ICD-10-CM

## 2025-03-12 DIAGNOSIS — R00.2 PALPITATIONS: Primary | ICD-10-CM

## 2025-03-12 DIAGNOSIS — E78.5 HYPERLIPIDEMIA, UNSPECIFIED HYPERLIPIDEMIA TYPE: ICD-10-CM

## 2025-03-12 DIAGNOSIS — I63.9 CARDIOEMBOLIC STROKE: ICD-10-CM

## 2025-03-12 RX ORDER — EZETIMIBE 10 MG/1
10 TABLET ORAL DAILY
Qty: 30 TABLET | Refills: 11 | Status: SHIPPED | OUTPATIENT
Start: 2025-03-12

## 2025-03-12 NOTE — PROGRESS NOTES
"[facility], [facility]    Chief Complaint  Palpitations    Subjective    History of Present Illness {CC  Problem List  Visit  Diagnosis   Encounters  Notes  Medications  Labs  Result Review Imaging  Media :23}     Jorgito Luis presents to [facility] CARDIOLOGY for   History of Present Illness     59-year-old male with history of hypertension, hyperlipidemia, generalized anxiety disorder, CVA, carotid artery stenosis.    History of CVA on 1/20/2025, right ICA stenosis.  Concern for multi focal acute punctate infarcts possible cardioembolic.Followed by neurology, concern for demyelinating/inflammatory process.  Patient had declined LP study during hospitalization.    Aspirin and statin,     History of sleep medicine study.  Was not able to sleep.  No results.    Patient denies chest pain, pressure, dyspnea, palpitations, dizziness, near-syncope, syncope, worsening edema.        Objective     Vital Signs:   Vitals:    03/12/25 1323   BP: 123/78   BP Location: Left arm   Patient Position: Sitting   Cuff Size: Adult   Pulse: 76   Resp: 16   SpO2: 96%   Weight: 96.3 kg (212 lb 6.4 oz)   Height: 180.3 cm (71\")     Body mass index is 29.62 kg/m².  Physical Exam  Vitals reviewed.   Constitutional:       General: He is not in acute distress.  Pulmonary:      Effort: Pulmonary effort is normal.   Skin:     Coloration: Skin is not pale.   Neurological:      Mental Status: He is alert.   Psychiatric:         Mood and Affect: Mood normal.         Behavior: Behavior normal. Behavior is cooperative.              Result Review  Data Reviewed:{ Labs  Result Review  Imaging  Med Tab  Media :23}  30-day monitor 1/29/2025: Average heart rate 72 bpm, PACs and PVCs less than 1%.  No atrial fibrillation detected.     Echocardiogram 1/21/2025: EF 61 to 65%, saline test negative, no concerning valvular disease, grade 1 diastolic dysfunction     Bilateral carotid " duplex 1/21/2025:   Right internal carotid artery demonstrates a 50-69% stenosis.    Antegrade right vertebral flow.    Left internal carotid artery demonstrates a less than 50% stenosis.    Antegrade left vertebral flow.     CT angiogram head and neck:  Impression   There is between 60 and 80% narrowing of the proximal right internal carotid artery. No vessel occlusion is seen. No evidence of infarct on CT perfusion.                 Assessment and Plan {CC Problem List  Visit Diagnosis  ROS  Review (Popup)  Health Maintenance  Quality  BestPractice  Medications  SmartSets  SnapShot Encounters  Media :23}   1. Palpitations  Patient's cardiac heart monitor was acceptable.  No atrial fibrillation detected    2. Cardioembolic stroke  Discussed possibility of referral for loop recorder.  Patient declined.  He would like to see neurology to see if loop recorder is indicated based on medical history.  Patient with carotid disease, being evaluated for demyelinating/inflammatory process.    Education provided for loop recorder.  Discussed its indications.  - ezetimibe (Zetia) 10 MG tablet; Take 1 tablet by mouth Daily.  Dispense: 30 tablet; Refill: 11      Will reach out to neurology to review patient's history and diagnostic studies to see if neurology feels that loop recorder is indicated.    3. Primary hypertension  Blood pressure well-controlled on current medication    4. Hyperlipidemia, unspecified hyperlipidemia type  Well on statin.  Continue Zetia with refills.  - ezetimibe (Zetia) 10 MG tablet; Take 1 tablet by mouth Daily.  Dispense: 30 tablet; Refill: 11          Follow Up {Instructions Charge Capture  Follow-up Communications :23}   Return if symptoms worsen or fail to improve.    Patient was given instructions and counseling regarding his condition or for health maintenance advice. Please see specific information pulled into the AVS if appropriate.  Patient was instructed to call the [facility] with any questions, concerns, or worsening symptoms.

## 2025-03-12 NOTE — TELEPHONE ENCOUNTER
Please call patient     Dr. Ramos reviewed patient's medical records.  He does not feel that a loop recorder is indicated at this time.    Patient should follow-up with neurology as scheduled.  They will continue to monitor and manage going forward.  Follow-up in the heart valve center on an as-needed basis.

## 2025-04-09 ENCOUNTER — OFFICE VISIT (OUTPATIENT)
Dept: INTERNAL MEDICINE | Facility: CLINIC | Age: 60
End: 2025-04-09
Payer: COMMERCIAL

## 2025-04-09 VITALS
SYSTOLIC BLOOD PRESSURE: 128 MMHG | BODY MASS INDEX: 29.82 KG/M2 | WEIGHT: 213 LBS | OXYGEN SATURATION: 98 % | HEART RATE: 86 BPM | HEIGHT: 71 IN | DIASTOLIC BLOOD PRESSURE: 84 MMHG | RESPIRATION RATE: 16 BRPM | TEMPERATURE: 97.8 F

## 2025-04-09 DIAGNOSIS — L81.9 ATYPICAL PIGMENTED SKIN LESION: ICD-10-CM

## 2025-04-09 DIAGNOSIS — Z00.00 ANNUAL PHYSICAL EXAM: Primary | ICD-10-CM

## 2025-04-09 DIAGNOSIS — I10 ESSENTIAL HYPERTENSION: ICD-10-CM

## 2025-04-09 DIAGNOSIS — R73.01 IMPAIRED FASTING BLOOD SUGAR: ICD-10-CM

## 2025-04-09 DIAGNOSIS — E78.2 MIXED HYPERLIPIDEMIA: ICD-10-CM

## 2025-04-09 DIAGNOSIS — I63.9 CEREBROVASCULAR ACCIDENT (CVA), UNSPECIFIED MECHANISM: ICD-10-CM

## 2025-04-09 DIAGNOSIS — Z12.5 SCREENING PSA (PROSTATE SPECIFIC ANTIGEN): ICD-10-CM

## 2025-04-09 RX ORDER — LOSARTAN POTASSIUM 25 MG/1
25 TABLET ORAL DAILY
Start: 2025-04-09

## 2025-04-09 RX ORDER — LOSARTAN POTASSIUM 25 MG/1
25 TABLET ORAL AS NEEDED
Qty: 60 TABLET | Refills: 3 | Status: SHIPPED | OUTPATIENT
Start: 2025-04-09 | End: 2025-04-09

## 2025-04-09 NOTE — PROGRESS NOTES
"    Subjective   Jorgito Luis is a 59 y.o. male and is here for a comprehensive physical exam. The patient reports problems - skin problem.  .    HPI: here today for annual physical with above concern.   Has several skin lesions he is concerned with and wanting a referral to dermatology. The two most bothersome include his right ear lobe and the chin. He has spent a lot of time outdoors throughout his life, [place]. Lesion on the ear has been there at least 2 years. Unsure if it has changed in color or size. It is brown in color with irregular borders.   Area on the chin has been present around 3 years. It is skin colored and raised. He commonly irritates it while shaving and it has become quite bothersome due to this.     Continues to take rosuvastatin and ezetimibe. Followed up with cardiology. He has not had repeat labs.     He has upcoming appointment with neurology (Dr. Ramos) in regards to possible demyelinating process. No new neurologic changes.     HTN is controlled with losartan. Denies chest pain, shortness of breath, lower extremity swelling, and dizziness.     Health Habits:  Eye exam within last 2 years? No, will schedule.   Dental exam every 6 months? Yes.   Exercise habits: walks for exercise   Healthy diet? Balanced diet     The ASCVD Risk score (Chito DK, et al., 2019) failed to calculate for the following reasons:    Risk score cannot be calculated because patient has a medical history suggesting prior/existing ASCVD    Do you take any herbs or supplements that were not prescribed by a doctor? yes  Are you taking calcium supplements? No  Are you taking aspirin daily? No     History:  Patient receives prostate care here: Yes  Date last PSA: No results found for: \"PSA\"  He reports Some decrease in urinary stream,  No nocturia, No dribbling, No hesitancy.        Sexual activity questions deferred to the physician.    The following portions of the patient's history were reviewed " and updated as appropriate: He  has a past medical history of Anxiety, Arthritis, Cancer (2014), Difficulty in walking, GERD (gastroesophageal reflux disease), Hearing loss, Hyperlipidemia, Hypertension, Low back pain, Memory loss, Sleep apnea, and Stroke.  He does not have any pertinent problems on file.  He  has a past surgical history that includes Abdominal surgery (2014); Esophagogastroduodenoscopy (N/A, 04/05/2024); Toe amputation (Left, 01/2024); Knee arthroscopy (Left); Low anterior bowel resection (04/2015); Ileostomy closure (06/2015); Colonoscopy; and Colon surgery.  His family history includes Alcohol abuse in his father; Arthritis in his father and mother; Cancer in his mother; Diabetes in his father and mother; Heart disease in his father and mother; Hyperlipidemia in his mother; Hypertension in his father and mother; Malig Hypertension in his father and mother; Obesity in his father.  He  reports that he has never smoked. He has never been exposed to tobacco smoke. He has never used smokeless tobacco. He reports that he does not drink alcohol and does not use drugs.  Current Outpatient Medications   Medication Sig Dispense Refill    acetaminophen (TYLENOL) 160 MG/5ML liquid Take  by mouth As Needed.      ascorbic acid (VITAMIN C) 250 MG tablet Take 1 tablet by mouth Daily.      CBD (cannabidiol) oral oil Take  by mouth As Needed.      Cholecalciferol 25 MCG (1000 UT) tablet Take 1 tablet by mouth Daily.      ezetimibe (Zetia) 10 MG tablet Take 1 tablet by mouth Daily. 30 tablet 11    Hydrocortisone Max St 1 % cream Apply  topically to the appropriate area as directed As Needed.      hydrOXYzine (ATARAX) 10 MG tablet Take 1-2 tablets by mouth 3 (Three) Times a Day As Needed for Anxiety.      ibuprofen (ADVIL,MOTRIN) 100 MG/5ML suspension Every 6 (Six) Hours As Needed.      ivermectin (STROMECTOL) 3 MG tablet tablet As Needed.      L-THEANINE PO Take  by mouth Daily.      Lidoderm 5 % Place 1 patch on  the skin as directed by provider As Needed.      losartan (COZAAR) 25 MG tablet Take 1 tablet by mouth As Needed (pt states as needed based on blood pressure). (Patient taking differently: Take 1 tablet by mouth Daily.)      Magnesium 400 MG tablet Take 400 mg by mouth Daily.      Melatonin-Pyridoxine (MELATONIN CR PO) Take  by mouth At Night As Needed.      polyethylene glycol (MIRALAX) 17 GM/SCOOP powder Take 17 g by mouth Daily As Needed (constipation). Follow directions given at office 238 g 5    psyllium (METAMUCIL) 58.6 % powder Take  by mouth 2 (Two) Times a Day. 425 g 5    rosuvastatin (CRESTOR) 40 MG tablet Take 1 tablet by mouth Every Night. 90 tablet 0    sucralfate (CARAFATE) 1 g tablet Take 1 tablet by mouth Daily As Needed.      witch hazel-glycerin (TUCKS) pad Apply  topically to the appropriate area as directed As Needed for Hemorrhoids. 100 each 5     No current facility-administered medications for this visit.       Review of Systems  Review of Systems   Constitutional:  Negative for fatigue, fever, unexpected weight gain and unexpected weight loss.   HENT:  Negative for sore throat and trouble swallowing.    Eyes:  Negative for visual disturbance.   Respiratory:  Negative for cough, shortness of breath and wheezing.    Cardiovascular:  Negative for chest pain, palpitations and leg swelling.   Gastrointestinal:  Negative for abdominal pain, blood in stool, constipation, diarrhea and nausea.   Endocrine: Negative for polydipsia, polyphagia and polyuria.   Musculoskeletal:  Positive for arthralgias. Negative for back pain and myalgias.   Skin:  Positive for skin lesions. Negative for rash.   Neurological:  Negative for dizziness, weakness, light-headedness and headache.        Balance problem     Psychiatric/Behavioral:  Negative for sleep disturbance and depressed mood. The patient is not nervous/anxious.          Objective   /84   Pulse 86   Temp 97.8 °F (36.6 °C) (Temporal)   Resp 16    "Ht 180.3 cm (71\")   Wt 96.6 kg (213 lb)   SpO2 98%   BMI 29.71 kg/m²   Physical Exam  Vitals and nursing note reviewed.   Constitutional:       General: He is not in acute distress.     Appearance: Normal appearance.   HENT:      Right Ear: Tympanic membrane and ear canal normal.      Left Ear: Tympanic membrane and ear canal normal.      Nose: Nose normal.      Mouth/Throat:      Mouth: Mucous membranes are moist.      Pharynx: No posterior oropharyngeal erythema.   Eyes:      Extraocular Movements: Extraocular movements intact.      Right eye: No nystagmus.      Left eye: No nystagmus.      Conjunctiva/sclera: Conjunctivae normal.      Pupils: Pupils are equal, round, and reactive to light.   Neck:      Thyroid: No thyroid mass or thyromegaly.      Vascular: No carotid bruit.   Cardiovascular:      Rate and Rhythm: Normal rate and regular rhythm.      Pulses: Normal pulses.      Heart sounds: Normal heart sounds. No murmur heard.  Pulmonary:      Effort: Pulmonary effort is normal.      Breath sounds: Normal breath sounds. No wheezing.   Abdominal:      General: Bowel sounds are normal. There is no distension.      Palpations: Abdomen is soft.      Tenderness: There is no abdominal tenderness.      Hernia: No hernia is present.   Musculoskeletal:         General: Deformity present. No tenderness.      Cervical back: Normal range of motion and neck supple. No muscular tenderness.      Right lower leg: No edema.      Left lower leg: No edema.   Lymphadenopathy:      Head:      Right side of head: No submandibular, tonsillar, preauricular or posterior auricular adenopathy.      Left side of head: No submandibular, tonsillar, preauricular or posterior auricular adenopathy.      Cervical: No cervical adenopathy.   Skin:     General: Skin is warm and dry.      Capillary Refill: Capillary refill takes less than 2 seconds.      Findings: Lesion (lobe of right ear with dark brown discoloration with irregular borders " about 8 mm in size. Chin with skin colored raised scaling papule regular borders around 5 mm) present. No rash.   Neurological:      Mental Status: He is alert.      Coordination: Coordination is intact.      Gait: Gait abnormal (using cane for stability).      Deep Tendon Reflexes: Reflexes normal.   Psychiatric:         Mood and Affect: Mood normal.         Behavior: Behavior normal.            Assessment & Plan   Healthy male exam.    Diagnosis Plan   1. Annual physical exam  Preventative maintenance discussed during visit and information provided in AVS.      2. Mixed hyperlipidemia  Update lipid panel fasting. Continue statin and ezetimibe at current dosage.      3. Essential hypertension  Stable. Continue current medications as prescribed. Recommend DASH diet, moderate-intensity exercises 4-5 times/week, medication compliance, and home blood pressure monitoring.         4. Cerebrovascular accident (CVA), unspecified mechanism  Keep follow-up with neurology. Continue current medication regimen.      5. Atypical pigmented skin lesion  Ambulatory Referral to Dermatology    Referral to dermatology placed.       6. Impaired fasting blood sugar  Hemoglobin A1c      7. Screening PSA (prostate specific antigen)  PSA SCREENING          2. Patient Counseling:  --Nutrition: Recommend high protein low carbohydrate diet, portion control, moderate sodium and caffeine intake.  --Discussed the issue of calcium supplement, and the daily use of baby aspirin if applicable.  --Discussed limitations of PSA screening for prostate cancer. Recommend PSA every 2 years starting at age 40 unless needing sooner due to symptoms/risk.   --Exercise: Stressed the importance of regular exercise.   --Substance Abuse: Discussed cessation/primary prevention of tobacco (if applicable, alcohol, or other drug use (if applicable); driving or other dangerous activities under the influence; availability of treatment for abuse.   --Sexuality: Discussed  sexually transmitted diseases, partner selection, use of condoms, avoidance of unintended pregnancy  and contraceptive alternatives.   --Injury prevention: Discussed safety belts, safety helmets, smoke detector, smoking near bedding or upholstery, avoid distracted driving (texting/phone use).  --Dental health: Discussed importance of regular tooth brushing, flossing, and dental visits.  --Immunizations reviewed and recommend staying up-to-date.  --Discussed benefits of screening colonoscopy vs cologuard testing for low risk individuals (if applicable).  --After hours service discussed with patient.    3. Discussed the patient's BMI with him.  The BMI is above average; BMI management plan is completed  4. Return in about 1 year (around 4/9/2026) for Annual physical.  Keisha Horton, DUGLAS  04/09/2025  13:19 EDT

## (undated) DEVICE — FRCP BX RADJAW4 NDL 2.8 240 STD OG

## (undated) DEVICE — ENDOSCOPY PORT CONNECTOR FOR OLYMPUS® SCOPES: Brand: ERBE

## (undated) DEVICE — VLV SXN AIR/H2O ORCAPOD3 1P/U STRL

## (undated) DEVICE — HYBRID CO2 TUBING/CAP SET FOR OLYMPUS® SCOPES & CO2 SOURCE: Brand: ERBE

## (undated) DEVICE — Device

## (undated) DEVICE — NET RESCUENET F/B RETRV

## (undated) DEVICE — CONMED SCOPE SAVER BITE BLOCK, 20X27 MM: Brand: SCOPE SAVER